# Patient Record
Sex: FEMALE | Race: ASIAN | NOT HISPANIC OR LATINO
[De-identification: names, ages, dates, MRNs, and addresses within clinical notes are randomized per-mention and may not be internally consistent; named-entity substitution may affect disease eponyms.]

---

## 2022-03-17 ENCOUNTER — APPOINTMENT (OUTPATIENT)
Dept: NEUROLOGY | Facility: CLINIC | Age: 87
End: 2022-03-17

## 2022-09-26 ENCOUNTER — APPOINTMENT (OUTPATIENT)
Dept: NEUROLOGY | Facility: CLINIC | Age: 87
End: 2022-09-26

## 2022-09-26 VITALS
HEART RATE: 95 BPM | BODY MASS INDEX: 22.66 KG/M2 | HEIGHT: 61 IN | WEIGHT: 120 LBS | DIASTOLIC BLOOD PRESSURE: 84 MMHG | SYSTOLIC BLOOD PRESSURE: 139 MMHG

## 2022-09-26 DIAGNOSIS — I67.9 CEREBROVASCULAR DISEASE, UNSPECIFIED: ICD-10-CM

## 2022-09-26 DIAGNOSIS — R41.89 OTHER SYMPTOMS AND SIGNS INVOLVING COGNITIVE FUNCTIONS AND AWARENESS: ICD-10-CM

## 2022-09-26 DIAGNOSIS — I10 ESSENTIAL (PRIMARY) HYPERTENSION: ICD-10-CM

## 2022-09-26 DIAGNOSIS — G47.09 OTHER INSOMNIA: ICD-10-CM

## 2022-09-26 DIAGNOSIS — I63.9 CEREBRAL INFARCTION, UNSPECIFIED: ICD-10-CM

## 2022-09-26 DIAGNOSIS — R45.3 DEMORALIZATION AND APATHY: ICD-10-CM

## 2022-09-26 PROCEDURE — 99205 OFFICE O/P NEW HI 60 MIN: CPT

## 2022-09-26 RX ORDER — AMLODIPINE BESYLATE 10 MG/1
10 TABLET ORAL DAILY
Refills: 0 | Status: ACTIVE | COMMUNITY
Start: 2022-09-26

## 2022-09-26 RX ORDER — ANASTROZOLE TABLETS 1 MG/1
1 TABLET ORAL DAILY
Refills: 0 | Status: ACTIVE | COMMUNITY
Start: 2022-09-26

## 2022-09-26 RX ORDER — ALENDRONATE SODIUM 70 MG/1
70 TABLET ORAL
Refills: 0 | Status: ACTIVE | COMMUNITY
Start: 2022-09-26

## 2022-09-26 RX ORDER — ROSUVASTATIN CALCIUM 10 MG/1
10 TABLET, FILM COATED ORAL DAILY
Refills: 0 | Status: ACTIVE | COMMUNITY
Start: 2022-09-26

## 2022-09-26 NOTE — REASON FOR VISIT
[Initial Evaluation] : an initial evaluation [Family Member] : family member [FreeTextEntry1] : cognitive decline.

## 2022-09-26 NOTE — ASSESSMENT
[FreeTextEntry1] : Assessment:\par 88yo RH  woman with cognitive decline, starting c.a. when she had a stroke in 2015.\par Upon review of the DWI images, stroke involved both thalami, R>L (more posteriorly though), and several subcortical areas in the posterior WM. THis alone may have caused some memory impairment. 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 Good VS and praxis and speech.\par \par Diagnostic Impression:\par -cognitive decline\par -cerebrovascular disease and hx of stroke (Bilateral PCA) \par \par Plan:\par -repeat MRI brain\par -labs\par -increase mirtazapine to 15mg; if sleep not improved, we will try Melatonin pathway\par -consider Aricept.\par \par A thorough discussion was entertained with the patient/caregiver regarding the use of psychoactive medications, their possible benefits and AE profile, including the risk of cardiovascular complications, including but not limited to applicable black box warning and teratogenicity, where appropriate.\par We discussed the benefits of being active, physically and mentally, and the need to to establish a routine in this respect.\par Driving abilities and firearms possession and use were discussed, in relation to progression of the cognitive decline, and the need to assess them periodically.\par Patient/caregiver advised to bring previous records to this office.\par All questions were answered at the time of the visit. We are certainly available for further discussion as needed.\par Patient/caregiver fully understands and agree with the plan.\par

## 2022-09-26 NOTE — HISTORY OF PRESENT ILLNESS
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 \par \par PMH:\par Pt seen by Dr. Cordova and Libman in  Re a stroke.\par \par Over the last few years, and more so recently, and after the 2015 stroke, she has started to have cognitive decline, with loss of interests, is less talkative, reduced interests and is progressively more apathetic. \par \par -Memory: STM issues, forgets all recent events\par -Speech: ok, but slower speech, and tends to refrain from speaking\par -Orientation: poor\par -Praxis: poor\par -Decision making/Executive fx/Multitasking: poor\par \par -Sleep: poor sleep reported, but stays in bed till late in the morning, goes back to bed after meals, and dozes off on couch\par \par -Appetite: ok, no significant changes; poor water drinker\par \par -Motor symptoms: residual L HP (2015 stroke), uses cane, tends to bend fwd a bit more\par \par -B/B: frequent UTIs, frequent polyuria\par \par -Psychiatric symptoms: more irritable\par \par -Functional status:\par Cordova Index of [place] in Activities of Daily Living:\par 1. Bathing/Showerin -need supervision\par 2. Dressin\par 3. Toiletin\par 4. Transferrin\par 5. Continence: 1\par 6: Feedin\par \par TOTAL: \par \par [place]-Kenny Instrumental Activities of Daily Living:\par A. Ability to Use Telephone: 0\par B. Shoppin\par C. Food Preparation: 0\par D. Housekeepin\par E. Laundry: 0\par F. Transportation: 0\par G. Responsibility for Own Medications: 0\par H: Ability to Handle Finances: 0\par \par TOTAL: 0\par \par CDR: 1.5\par \par -Professional status: housewife\par \par PCP and other physicians:\par -PCP: \par Dr. Charli Hobbs MD\par Internist in [place], [place]\par Address: [address] [place] [address]\par Phone: [phone], \par -Neuro: Libman, [place]\par \par Workup done:\par n.a.

## 2022-09-26 NOTE — PHYSICAL EXAM
[General Appearance - Alert] : alert [General Appearance - In No Acute Distress] : in no acute distress [Impaired Insight] : insight and judgment were intact [Affect] : the affect was normal [Person] : oriented to person [Remote Intact] : remote memory intact [Registration Intact] : recent registration memory intact [Concentration Intact] : normal concentrating ability [Visual Intact] : visual attention was ~T not ~L decreased [Naming Objects] : no difficulty naming common objects [Repeating Phrases] : no difficulty repeating a phrase [Fluency] : fluency intact [Comprehension] : comprehension intact [Reading] : reading intact [Past History] : adequate knowledge of personal past history [Vocabulary] : adequate range of vocabulary [Total Score ___ / 30] : the patient achieved a score of [unfilled] /30 [Date / Time ___ / 5] : date / time [unfilled] / 5 [Place ___ / 5] : place [unfilled] / 5 [Registration ___ / 3] : registration [unfilled] / 3 [Serial Sevens ___/5] : serial sevens [unfilled] / 5 [Naming 2 Objects ___ / 2] : naming two objects [unfilled] / 2 [Repeating a Sentence ___ / 1] : repeating a sentence [unfilled] / 1 [Writing a Sentence ___ / 1] : write sentence [unfilled] / 1 [3-stage Verbal Command ___ / 3] : three-stage verbal command [unfilled] / 3 [Written Command ___ / 1] : written command [unfilled] / 1 [Copy a Design ___ / 1] : copy a design [unfilled] / 1 [Recall ___ / 3] : recall [unfilled] / 3 [Cranial Nerves Optic (II)] : visual acuity intact bilaterally,  visual fields full to confrontation, pupils equal round and reactive to light [Cranial Nerves Trigeminal (V)] : facial sensation intact symmetrically [Cranial Nerves Facial (VII)] : face symmetrical [Cranial Nerves Vestibulocochlear (VIII)] : hearing was intact bilaterally [Cranial Nerves Glossopharyngeal (IX)] : tongue and palate midline [Cranial Nerves Accessory (XI - Cranial And Spinal)] : head turning and shoulder shrug symmetric [Cranial Nerves Hypoglossal (XII)] : there was no tongue deviation with protrusion [Motor Strength] : muscle strength was normal in all four extremities [Involuntary Movements] : no involuntary movements were seen [No Muscle Atrophy] : normal bulk in all four extremities [Motor Handedness Right-Handed] : the patient is right hand dominant [Sensation Tactile Decrease] : light touch was intact [Balance] : balance was intact [2+] : Ankle jerk left 2+ [Sclera] : the sclera and conjunctiva were normal [PERRL With Normal Accommodation] : pupils were equal in size, round, reactive to light, with normal accommodation [Outer Ear] : the ears and nose were normal in appearance [Oropharynx] : the oropharynx was normal [Neck Appearance] : the appearance of the neck was normal [Neck Cervical Mass (___cm)] : no neck mass was observed [Jugular Venous Distention Increased] : there was no jugular-venous distention [Thyroid Diffuse Enlargement] : the thyroid was not enlarged [Thyroid Nodule] : there were no palpable thyroid nodules [Auscultation Breath Sounds / Voice Sounds] : lungs were clear to auscultation bilaterally [Heart Rate And Rhythm] : heart rate was normal and rhythm regular [Heart Sounds] : normal S1 and S2 [Heart Sounds Gallop] : no gallops [Murmurs] : no murmurs [Heart Sounds Pericardial Friction Rub] : no pericardial rub [Arterial Pulses Carotid] : carotid pulses were normal with no bruits [Full Pulse] : the pedal pulses are present [Edema] : there was no peripheral edema [Bowel Sounds] : normal bowel sounds [Abdomen Soft] : soft [Abdomen Tenderness] : non-tender [Abdomen Mass (___ Cm)] : no abdominal mass palpated [No CVA Tenderness] : no ~M costovertebral angle tenderness [No Spinal Tenderness] : no spinal tenderness [Abnormal Walk] : normal gait [Nail Clubbing] : no clubbing  or cyanosis of the fingernails [Musculoskeletal - Swelling] : no joint swelling seen [Motor Tone] : muscle strength and tone were normal [Skin Color & Pigmentation] : normal skin color and pigmentation [Skin Turgor] : normal skin turgor [] : no rash [Place] : disoriented to place [Time] : disoriented to time [Short Term Intact] : short term memory impaired [Span Intact] : the attention span was decreased [Writing A Sentence] : difficulty writing a sentence [Current Events] : inadequate knowledge of current events [Motor Strength Upper Extremities Bilaterally] : strength was normal in both upper extremities [Motor Strength Lower Extremities Bilaterally] : strength was normal in both lower extremities [Romberg's Sign] : Romberg's sign was negtive [Limited Balance] : balance was intact [Past-pointing] : there was no past-pointing [Tremor] : no tremor present [Plantar Reflex Right Only] : normal on the right [Plantar Reflex Left Only] : normal on the left [FreeTextEntry4] : Exam very limited. \par Alt pattern: ok\par Spiral: ok\par Clock: ok.\par Praxis: grossly ok. 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 [FreeTextEntry8] : cautioned gait, slight limp on L side

## 2023-01-18 ENCOUNTER — RX RENEWAL (OUTPATIENT)
Age: 88
End: 2023-01-18

## 2023-03-30 ENCOUNTER — RX RENEWAL (OUTPATIENT)
Age: 88
End: 2023-03-30

## 2024-03-19 ENCOUNTER — RX RENEWAL (OUTPATIENT)
Age: 89
End: 2024-03-19

## 2024-03-19 RX ORDER — MIRTAZAPINE 15 MG/1
15 TABLET, FILM COATED ORAL
Qty: 90 | Refills: 3 | Status: ACTIVE | COMMUNITY
Start: 2022-09-26 | End: 1900-01-01

## 2025-03-10 ENCOUNTER — RX RENEWAL (OUTPATIENT)
Age: 89
End: 2025-03-10

## 2025-06-24 ENCOUNTER — INPATIENT (INPATIENT)
Facility: HOSPITAL | Age: 89
LOS: 9 days | Discharge: NURSING, REHAB OR LONG-TERM CARE | DRG: 64 | End: 2025-07-04
Attending: INTERNAL MEDICINE | Admitting: INTERNAL MEDICINE
Payer: MEDICARE

## 2025-06-24 VITALS
DIASTOLIC BLOOD PRESSURE: 73 MMHG | HEART RATE: 90 BPM | SYSTOLIC BLOOD PRESSURE: 151 MMHG | RESPIRATION RATE: 16 BRPM | HEIGHT: 61 IN | OXYGEN SATURATION: 98 %

## 2025-06-24 LAB
ALBUMIN SERPL ELPH-MCNC: 4.1 G/DL — SIGNIFICANT CHANGE UP (ref 3.3–5)
ALP SERPL-CCNC: 66 U/L — SIGNIFICANT CHANGE UP (ref 40–120)
ALT FLD-CCNC: 29 U/L — SIGNIFICANT CHANGE UP (ref 10–45)
APPEARANCE UR: CLEAR — SIGNIFICANT CHANGE UP
APTT BLD: 38.9 SEC — HIGH (ref 26.1–36.8)
AST SERPL-CCNC: 29 U/L — SIGNIFICANT CHANGE UP (ref 10–40)
BACTERIA # UR AUTO: ABNORMAL /HPF
BASOPHILS # BLD AUTO: 0.05 K/UL — SIGNIFICANT CHANGE UP (ref 0–0.2)
BASOPHILS NFR BLD AUTO: 0.6 % — SIGNIFICANT CHANGE UP (ref 0–2)
BILIRUB SERPL-MCNC: 1 MG/DL — SIGNIFICANT CHANGE UP (ref 0.2–1.2)
BILIRUB UR-MCNC: NEGATIVE — SIGNIFICANT CHANGE UP
BUN SERPL-MCNC: 17 MG/DL — SIGNIFICANT CHANGE UP (ref 7–23)
CALCIUM SERPL-MCNC: 9.2 MG/DL — SIGNIFICANT CHANGE UP (ref 8.4–10.5)
CAST: 2 /LPF — SIGNIFICANT CHANGE UP (ref 0–4)
CHLORIDE SERPL-SCNC: 103 MMOL/L — SIGNIFICANT CHANGE UP (ref 96–108)
CO2 SERPL-SCNC: 26 MMOL/L — SIGNIFICANT CHANGE UP (ref 22–31)
COLOR SPEC: YELLOW — SIGNIFICANT CHANGE UP
CREAT SERPL-MCNC: 0.96 MG/DL — SIGNIFICANT CHANGE UP (ref 0.5–1.3)
DIFF PNL FLD: NEGATIVE — SIGNIFICANT CHANGE UP
EGFR: 57 ML/MIN/1.73M2 — LOW
EGFR: 57 ML/MIN/1.73M2 — LOW
EOSINOPHIL # BLD AUTO: 0.14 K/UL — SIGNIFICANT CHANGE UP (ref 0–0.5)
EOSINOPHIL NFR BLD AUTO: 1.7 % — SIGNIFICANT CHANGE UP (ref 0–6)
GAS PNL BLDV: SIGNIFICANT CHANGE UP
GLUCOSE SERPL-MCNC: 117 MG/DL — HIGH (ref 70–99)
GLUCOSE UR QL: NEGATIVE MG/DL — SIGNIFICANT CHANGE UP
HCT VFR BLD CALC: 39.8 % — SIGNIFICANT CHANGE UP (ref 34.5–45)
HGB BLD-MCNC: 12.9 G/DL — SIGNIFICANT CHANGE UP (ref 11.5–15.5)
IMM GRANULOCYTES # BLD AUTO: 0.03 K/UL — SIGNIFICANT CHANGE UP (ref 0–0.07)
IMM GRANULOCYTES NFR BLD AUTO: 0.4 % — SIGNIFICANT CHANGE UP (ref 0–0.9)
INR BLD: 1.43 RATIO — HIGH (ref 0.85–1.16)
KETONES UR QL: NEGATIVE MG/DL — SIGNIFICANT CHANGE UP
LEUKOCYTE ESTERASE UR-ACNC: ABNORMAL
LYMPHOCYTES # BLD AUTO: 3.12 K/UL — SIGNIFICANT CHANGE UP (ref 1–3.3)
LYMPHOCYTES NFR BLD AUTO: 38.9 % — SIGNIFICANT CHANGE UP (ref 13–44)
MCHC RBC-ENTMCNC: 31.1 PG — SIGNIFICANT CHANGE UP (ref 27–34)
MCHC RBC-ENTMCNC: 32.4 G/DL — SIGNIFICANT CHANGE UP (ref 32–36)
MCV RBC AUTO: 95.9 FL — SIGNIFICANT CHANGE UP (ref 80–100)
MONOCYTES # BLD AUTO: 0.72 K/UL — SIGNIFICANT CHANGE UP (ref 0–0.9)
MONOCYTES NFR BLD AUTO: 9 % — SIGNIFICANT CHANGE UP (ref 2–14)
NEUTROPHILS # BLD AUTO: 3.97 K/UL — SIGNIFICANT CHANGE UP (ref 1.8–7.4)
NEUTROPHILS NFR BLD AUTO: 49.4 % — SIGNIFICANT CHANGE UP (ref 43–77)
NITRITE UR-MCNC: POSITIVE
NRBC # BLD AUTO: 0 K/UL — SIGNIFICANT CHANGE UP (ref 0–0)
NRBC # FLD: 0 K/UL — SIGNIFICANT CHANGE UP (ref 0–0)
NRBC BLD AUTO-RTO: 0 /100 WBCS — SIGNIFICANT CHANGE UP (ref 0–0)
PH UR: 7.5 — SIGNIFICANT CHANGE UP (ref 5–8)
PLATELET # BLD AUTO: 197 K/UL — SIGNIFICANT CHANGE UP (ref 150–400)
PMV BLD: 10.6 FL — SIGNIFICANT CHANGE UP (ref 7–13)
POTASSIUM SERPL-MCNC: 3.9 MMOL/L — SIGNIFICANT CHANGE UP (ref 3.5–5.3)
POTASSIUM SERPL-SCNC: 3.9 MMOL/L — SIGNIFICANT CHANGE UP (ref 3.5–5.3)
PROT SERPL-MCNC: 7 G/DL — SIGNIFICANT CHANGE UP (ref 6–8.3)
PROT UR-MCNC: NEGATIVE MG/DL — SIGNIFICANT CHANGE UP
PROTHROM AB SERPL-ACNC: 16.2 SEC — HIGH (ref 9.9–13.4)
RBC # BLD: 4.15 M/UL — SIGNIFICANT CHANGE UP (ref 3.8–5.2)
RBC # FLD: 12.4 % — SIGNIFICANT CHANGE UP (ref 10.3–14.5)
RBC CASTS # UR COMP ASSIST: 1 /HPF — SIGNIFICANT CHANGE UP (ref 0–4)
SODIUM SERPL-SCNC: 142 MMOL/L — SIGNIFICANT CHANGE UP (ref 135–145)
SP GR SPEC: 1.02 — SIGNIFICANT CHANGE UP (ref 1–1.03)
SQUAMOUS # UR AUTO: 0 /HPF — SIGNIFICANT CHANGE UP (ref 0–5)
TROPONIN T, HIGH SENSITIVITY RESULT: 9 NG/L — SIGNIFICANT CHANGE UP (ref 0–51)
UROBILINOGEN FLD QL: 0.2 MG/DL — SIGNIFICANT CHANGE UP (ref 0.2–1)
WBC # BLD: 8.03 K/UL — SIGNIFICANT CHANGE UP (ref 3.8–10.5)
WBC # FLD AUTO: 8.03 K/UL — SIGNIFICANT CHANGE UP (ref 3.8–10.5)
WBC UR QL: 4 /HPF — SIGNIFICANT CHANGE UP (ref 0–5)

## 2025-06-24 PROCEDURE — 99285 EMERGENCY DEPT VISIT HI MDM: CPT

## 2025-06-24 PROCEDURE — 83605 ASSAY OF LACTIC ACID: CPT

## 2025-06-24 PROCEDURE — 70450 CT HEAD/BRAIN W/O DYE: CPT

## 2025-06-24 PROCEDURE — 0042T: CPT

## 2025-06-24 PROCEDURE — 84484 ASSAY OF TROPONIN QUANT: CPT

## 2025-06-24 PROCEDURE — 70496 CT ANGIOGRAPHY HEAD: CPT | Mod: 26

## 2025-06-24 PROCEDURE — 70496 CT ANGIOGRAPHY HEAD: CPT

## 2025-06-24 PROCEDURE — 85610 PROTHROMBIN TIME: CPT

## 2025-06-24 PROCEDURE — 70498 CT ANGIOGRAPHY NECK: CPT

## 2025-06-24 PROCEDURE — 82330 ASSAY OF CALCIUM: CPT

## 2025-06-24 PROCEDURE — 81001 URINALYSIS AUTO W/SCOPE: CPT

## 2025-06-24 PROCEDURE — 85730 THROMBOPLASTIN TIME PARTIAL: CPT

## 2025-06-24 PROCEDURE — 93010 ELECTROCARDIOGRAM REPORT: CPT

## 2025-06-24 PROCEDURE — 84132 ASSAY OF SERUM POTASSIUM: CPT

## 2025-06-24 PROCEDURE — 82962 GLUCOSE BLOOD TEST: CPT

## 2025-06-24 PROCEDURE — 70450 CT HEAD/BRAIN W/O DYE: CPT | Mod: 26,XU

## 2025-06-24 PROCEDURE — 80053 COMPREHEN METABOLIC PANEL: CPT

## 2025-06-24 PROCEDURE — 85014 HEMATOCRIT: CPT

## 2025-06-24 PROCEDURE — 82803 BLOOD GASES ANY COMBINATION: CPT

## 2025-06-24 PROCEDURE — 85018 HEMOGLOBIN: CPT

## 2025-06-24 PROCEDURE — 82947 ASSAY GLUCOSE BLOOD QUANT: CPT

## 2025-06-24 PROCEDURE — 85025 COMPLETE CBC W/AUTO DIFF WBC: CPT

## 2025-06-24 PROCEDURE — 87086 URINE CULTURE/COLONY COUNT: CPT

## 2025-06-24 PROCEDURE — 70498 CT ANGIOGRAPHY NECK: CPT | Mod: 26

## 2025-06-24 PROCEDURE — 99222 1ST HOSP IP/OBS MODERATE 55: CPT | Mod: GC

## 2025-06-24 PROCEDURE — 84295 ASSAY OF SERUM SODIUM: CPT

## 2025-06-24 PROCEDURE — 82435 ASSAY OF BLOOD CHLORIDE: CPT

## 2025-06-24 RX ORDER — B1/B2/B3/B5/B6/B12/VIT C/FOLIC 500-0.5 MG
1 TABLET ORAL
Refills: 0 | DISCHARGE

## 2025-06-24 RX ORDER — ROSUVASTATIN CALCIUM 5 MG/1
1 TABLET, FILM COATED ORAL
Refills: 0 | DISCHARGE

## 2025-06-24 RX ORDER — LOSARTAN POTASSIUM 100 MG/1
100 TABLET, FILM COATED ORAL DAILY
Refills: 0 | Status: DISCONTINUED | OUTPATIENT
Start: 2025-06-24 | End: 2025-06-29

## 2025-06-24 RX ORDER — APIXABAN 2.5 MG/1
1 TABLET, FILM COATED ORAL
Refills: 0 | DISCHARGE

## 2025-06-24 RX ORDER — CEFTRIAXONE 500 MG/1
1000 INJECTION, POWDER, FOR SOLUTION INTRAMUSCULAR; INTRAVENOUS EVERY 24 HOURS
Refills: 0 | Status: DISCONTINUED | OUTPATIENT
Start: 2025-06-24 | End: 2025-06-29

## 2025-06-24 RX ORDER — APIXABAN 2.5 MG/1
2.5 TABLET, FILM COATED ORAL EVERY 12 HOURS
Refills: 0 | Status: DISCONTINUED | OUTPATIENT
Start: 2025-06-24 | End: 2025-07-04

## 2025-06-24 RX ORDER — CEFTRIAXONE 500 MG/1
1000 INJECTION, POWDER, FOR SOLUTION INTRAMUSCULAR; INTRAVENOUS ONCE
Refills: 0 | Status: COMPLETED | OUTPATIENT
Start: 2025-06-24 | End: 2025-06-24

## 2025-06-24 RX ORDER — OLMESARTAN MEDOXOMIL 5 MG/1
1 TABLET, FILM COATED ORAL
Refills: 0 | DISCHARGE

## 2025-06-24 RX ORDER — METOPROLOL SUCCINATE 50 MG/1
1 TABLET, EXTENDED RELEASE ORAL
Refills: 0 | DISCHARGE

## 2025-06-24 RX ORDER — MIRTAZAPINE 30 MG/1
1 TABLET, FILM COATED ORAL
Refills: 0 | DISCHARGE

## 2025-06-24 RX ORDER — ROSUVASTATIN CALCIUM 5 MG/1
20 TABLET, FILM COATED ORAL AT BEDTIME
Refills: 0 | Status: DISCONTINUED | OUTPATIENT
Start: 2025-06-24 | End: 2025-07-04

## 2025-06-24 RX ORDER — METOPROLOL SUCCINATE 50 MG/1
100 TABLET, EXTENDED RELEASE ORAL DAILY
Refills: 0 | Status: DISCONTINUED | OUTPATIENT
Start: 2025-06-24 | End: 2025-06-29

## 2025-06-24 RX ORDER — ONDANSETRON HCL/PF 4 MG/2 ML
4 VIAL (ML) INJECTION ONCE
Refills: 0 | Status: COMPLETED | OUTPATIENT
Start: 2025-06-24 | End: 2025-06-24

## 2025-06-24 RX ORDER — MIRTAZAPINE 30 MG/1
15 TABLET, FILM COATED ORAL AT BEDTIME
Refills: 0 | Status: DISCONTINUED | OUTPATIENT
Start: 2025-06-24 | End: 2025-06-25

## 2025-06-24 RX ADMIN — APIXABAN 2.5 MILLIGRAM(S): 2.5 TABLET, FILM COATED ORAL at 19:25

## 2025-06-24 RX ADMIN — METOPROLOL SUCCINATE 100 MILLIGRAM(S): 50 TABLET, EXTENDED RELEASE ORAL at 19:25

## 2025-06-24 RX ADMIN — CEFTRIAXONE 100 MILLIGRAM(S): 500 INJECTION, POWDER, FOR SOLUTION INTRAMUSCULAR; INTRAVENOUS at 11:52

## 2025-06-24 RX ADMIN — LOSARTAN POTASSIUM 100 MILLIGRAM(S): 100 TABLET, FILM COATED ORAL at 19:25

## 2025-06-24 RX ADMIN — ROSUVASTATIN CALCIUM 20 MILLIGRAM(S): 5 TABLET, FILM COATED ORAL at 22:12

## 2025-06-24 RX ADMIN — MIRTAZAPINE 15 MILLIGRAM(S): 30 TABLET, FILM COATED ORAL at 22:12

## 2025-06-24 RX ADMIN — Medication 4 MILLIGRAM(S): at 10:05

## 2025-06-24 NOTE — H&P ADULT - HISTORY OF PRESENT ILLNESS
88 yo female, with PMHx of HTN, HLD, prior stroke in 2015 with residual left eye vision loss (bilateral occipital lobes and right posteromedial temporal lobe, right thalamic lacunar, right basal ganglia, right paramedian midbrain stroke), Afib (on Eliquis), cognitive decline (follows with Dr. Argueta), BIBEMS due to inability to speak or ambulate. LKW at around 3 AM on 6/24/2025. Daughter at bedside reports that she noted that patient was only making grunting sounds in response to conversation and appeared to have difficulty recognizing her daughter when she saw patient on 6/24/2025 at around 9 AM. However, daughter reports that patient has now returned to baseline. Of note, daughter stated that patient was reporting abdominal pain on 6/23/2025. Ambulates using a cane at baseline, requires assistance with eating and bathing. Reports adherence to Eliquis currently but reportedly had not been taking Eliquis for about 1 month for unknown reasons. Translation from Cantonese provided by daughter at bedside per patient and family preference.

## 2025-06-24 NOTE — ED ADULT NURSE REASSESSMENT NOTE - NS ED NURSE REASSESS COMMENT FT1
Handoff received from break nurse. Q1 hour neuro check not done at 1325, performed when back from break.

## 2025-06-24 NOTE — ED ADULT NURSE REASSESSMENT NOTE - GENERAL PATIENT STATE
comfortable appearance/cooperative/family/SO at bedside
comfortable appearance/cooperative/family/SO at bedside/resting/sleeping
comfortable appearance/cooperative/family/SO at bedside

## 2025-06-24 NOTE — ED ADULT NURSE NOTE - OBJECTIVE STATEMENT
Pt is a 89y F PMH CVA w/ L sided residual weakness, HTN, HLD, Afib on Eliquis brought in by EMS presenting from home as Code stroke for difficulty speaking at approx 9AM LKW 3AM. Pt is primarily Cantonese speaking, daughter at bedside able to provide hx and translate. Per daughter pt currently at baseline. Pt taken to CT, began vomiting. Pt denies fever, chills, cough, abd pain. Pt is A&Ox4, breathing unlabored and spontaneous, move all extremities. Pt resting in stretcher, bed in lowest position, aware of plan of care. Comfort and safety measures maintained.

## 2025-06-24 NOTE — ED PROVIDER NOTE - NSICDXPASTMEDICALHX_GEN_ALL_CORE_FT
PAST MEDICAL HISTORY:  Afib     CVA (cerebral vascular accident)     Hyperlipidemia     Hypertension      Fluconazole Counseling:  Patient counseled regarding adverse effects of fluconazole including but not limited to headache, diarrhea, nausea, upset stomach, liver function test abnormalities, taste disturbance, and stomach pain.  There is a rare possibility of liver failure that can occur when taking fluconazole.  The patient understands that monitoring of LFTs and kidney function test may be required, especially at baseline. The patient verbalized understanding of the proper use and possible adverse effects of fluconazole.  All of the patient's questions and concerns were addressed.

## 2025-06-24 NOTE — ED PROVIDER NOTE - ATTENDING CONTRIBUTION TO CARE
code stroke called on arrival    attending Sunil: 89y h/o prior stroke with residual L sided weakness, HTN, HLD, Afib on Eliquis p/w difficulty speaking, LKN 3 AM.  Daughter at bedside for translation and collateral.  Seemingly had difficulty getting words out as well as inability to walk. Exam as above. Will obtain labs, STAT CT imaging and neuro eval, reassess

## 2025-06-24 NOTE — ED ADULT NURSE NOTE - NSFALLHARMRISKINTERV_ED_ALL_ED

## 2025-06-24 NOTE — H&P ADULT - NSHPPHYSICALEXAM_GEN_ALL_CORE
Vital Signs Last 24 Hrs  T(C): 36.6 (24 Jun 2025 18:40), Max: 36.6 (24 Jun 2025 18:40)  T(F): 97.8 (24 Jun 2025 18:40), Max: 97.8 (24 Jun 2025 18:40)  HR: 82 (24 Jun 2025 18:40) (82 - 96)  BP: 111/72 (24 Jun 2025 18:40) (110/77 - 151/73)  BP(mean): 85 (24 Jun 2025 18:40) (85 - 102)  RR: 20 (24 Jun 2025 18:40) (15 - 20)  SpO2: 95% (24 Jun 2025 18:40) (95% - 98%)    Parameters below as of 24 Jun 2025 18:40  Patient On (Oxygen Delivery Method): room air      PHYSICAL EXAM:  GENERAL: NAD, well-developed  HEAD:  Atraumatic, Normocephalic  EYES: EOMI, PERRLA, conjunctiva and sclera clear  NECK: Supple, No JVD  CHEST/LUNG: Clear to auscultation bilaterally; No wheeze  HEART: s1/2. irreg irreg   ABDOMEN: Soft, Nontender, Nondistended; Bowel sounds present  EXTREMITIES:  2+ Peripheral Pulses, No clubbing, cyanosis, or edema  PSYCH: AAOx3  NEUROLOGY: moves all ext. slurred speech   SKIN: No rashes or lesions

## 2025-06-24 NOTE — ED PROVIDER NOTE - PROGRESS NOTE DETAILS
Jeremiah Meehan DO (PGY3)  received s/o on this patient -  89RHF with PMHx of HTN, HLD, prior stroke in 2015 with residual left eye vision loss (bilateral occipital lobes and right posteromedial temporal lobe, right thalamic lacunar, right basal ganglia, right paramedian midbrain stroke), Afib (on Eliquis), cognitive decline (follows with Dr. Argueta)  Patient with UTI, neurology has evaluated patient - recommending inpatient MRI. Endorsed plan to patient.

## 2025-06-24 NOTE — ED ADULT NURSE REASSESSMENT NOTE - NS ED NURSE REASSESS COMMENT FT1
Handoff received from RN Stanton. Upon assessment Pt AOx4, primarily Cantonese speaking, daughter at bedside translating, breathing spontaneously, hemodynamically stable, gross neuro and motor sensory intact.

## 2025-06-24 NOTE — ED ADULT NURSE NOTE - CAS EDN DISCHARGE INTERVENTIONS
Problem: VTE, Risk for  Goal: # No s/s of VTE  Outcome: Outcome Met, Continue evaluating goal progress toward completion  Goal: # Verbalizes understanding of VTE risk factors and prevention  Description: Document education using the patient education activity.   Outcome: Outcome Met, Continue evaluating goal progress toward completion     Problem: Pressure Injury, Risk for  Goal: # Verbalizes understanding of PI risk factors and prevention strategies  Description: Document education using the patient education activity.   Outcome: Outcome Met, Complete Goal     Problem: Non-Pressure Injury Wound  Goal: # Verbalizes understanding of wound and wound care  Description: If abnormality is a skin tear, avoid using tape on skin including transparent dressings. Document education using the patient education activity.  Outcome: Outcome Met, Continue evaluating goal progress toward completion  Goal: Participates in wound care activities  Outcome: Outcome Not Met, Continue to Monitor     Problem: Myocardial Dysfunction requiring Mechanical Circulatory Support Device  Goal: Hemodynamic stability achieved/maintained  Description: Ensure device is set to optimize hemodynamics  Outcome: Outcome Met, Continue evaluating goal progress toward completion  Goal: Mechanical Circulatory Support Device (MCSD) safety maintained  Description: Patient remains on AC Power until test is passed Power connections ONLY with MCSD staff until test passed. Perform Mechanical Circulatory Support Device system checks per protocol.  Assess drive lines for kinks, loose connections and stability Q 8 hrs and with activity.  Keep backup console near bedside and plugged into AC power.  Outcome: Outcome Met, Continue evaluating goal progress toward completion  Goal: Activity level is maintained/returns to level needed for d/c  Description: SpO2, HR, MCSD flows, RR, & BP in expected range in response to activity, walking, ADLs, stairs  Outcome: Outcome Not  Met, Continue to Monitor  Goal: Verbalizes understanding of preop, periop and postop teaching (pt/family)  Description: Document in Patient Education Activity  Outcome: Outcome Not Met, Continue to Monitor     Problem: Activity Intolerance  Goal: # Functional status is maintained or returned to baseline  Outcome: Outcome Not Met, Continue to Monitor  Goal: # Tolerates activity for d/c setting with no clinical problems  Outcome: Outcome Not Met, Continue to Monitor     Problem: Coping, Ineffective  Goal: Identifies personal stressors  Outcome: Outcome Not Met, Continue to Monitor  Goal: Identifies potential coping strategies  Outcome: Outcome Not Met, Continue to Monitor  Goal: Identifies personal or community-based support systems (Patient)  Outcome: Outcome Met, Complete Goal      IV intact

## 2025-06-24 NOTE — ED PROVIDER NOTE - CLINICAL SUMMARY MEDICAL DECISION MAKING FREE TEXT BOX
89 female past medical history CVA with left-sided residual weakness, hypertension, hyperlipidemia, cardiac arrhythmia on Eliquis presenting as a code stroke for difficulty speaking.  Last known normal 3 AM.  Daughter at bedside for translation and collateral.  When daughter went to check on patient this morning she was trying to speak but was unable to get the words out.  So that the patient could not walk.  Yesterday and at 3 AM this morning the patient was at her baseline.  Vitals nonactionable.  On exam: Patient with expressive aphasia. Pupils equal round and reactive to light.  Moving all extremities.  Heart regular rate.  Lungs with symmetric chest expansion bilaterally.  Differential diagnosis includes but not limited to: CVA, TIA, infectious vs metabolic etiology.  Plan: blood work, UA, f/u brain imaging and neuro recs. Will re-assess. Likely tba. 89 female past medical history CVA with left-sided residual weakness, hypertension, hyperlipidemia, cardiac arrhythmia on Eliquis presenting as a code stroke for difficulty speaking.  Last known normal 3 AM.  Daughter at bedside for translation and collateral.  When daughter went to check on patient this morning she was trying to speak but was unable to get the words out.  So that the patient could not walk.  Yesterday and at 3 AM this morning the patient was at her baseline.  Vitals nonactionable.  On exam: Patient with expressive aphasia. Pupils equal round and reactive to light.  Moving all extremities.  Heart regular rate.  Lungs with symmetric chest expansion bilaterally.  Differential diagnosis includes but not limited to: CVA, TIA, infectious vs metabolic etiology.  Plan: blood work, UA, f/u brain imaging and neuro recs. Will re-assess.

## 2025-06-24 NOTE — CONSULT NOTE ADULT - ASSESSMENT
Assessment:    Impression:    Recommendations: Assessment:    Impression: Transient episode of disorientation and altered mental status. 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     Recommendations:  - Continue home Eliquis  - Toxic Metabolic workup: UA, UCx, CXR, RVP+COVID, Mg, Phos  - Infectious workup per ED   - Follow up with Dr. Argueta and Dr. Libman at [address] [place] [address] 1-2 weeks after discharge. Please instruct the patient to call [phone] to schedule this appointment.      Case and plan discussed with stroke fellow Dr. Cordoba under supervision of attending Dr. Bowden.    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 Argueta), BIBEMS due to inability to speak or ambulate. Neurological exam with no new focal deficits, as patient continues to have left eye visual field deficit, which has been a residual deficit from prior stroke in 2015. CTH on 6/24/2025 nonacute, CTA H/N with no LVO, CTP with no deficit.     Impression: Transient episode of disorientation and altered mental status with current return to baseline. 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     Recommendations:  - Continue home Eliquis  - Toxic Metabolic workup: UA, UCx, CXR, RVP+COVID, Mg, Phos  - Infectious workup per ED   - No further neurovascular workup required at this time  - Follow up with Dr. Argueta and Dr. Libman at [address] [place] [address] 1-2 weeks after discharge. Please instruct the patient to call [phone] to schedule this appointment.        Case and plan discussed with stroke fellow Dr. Cordoba under supervision of attending Dr. Bowden.    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 Argueta), BIBEMS due to inability to speak or ambulate. Neurological exam with no new focal deficits, as patient continues to have left eye visual field deficit, which has been a residual deficit from prior stroke in 2015. CTH on 6/24/2025 nonacute, CTA H/N with no LVO, CTP with no deficit.     Impression: Transient episode of disorientation and altered mental status with current return to baseline. 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     Recommendations:  - Continue home Eliquis  - Toxic Metabolic workup: UA, UCx, CXR, RVP+COVID, Mg, Phos  - Infectious workup per ED   - No further neurovascular workup required at this time  - Follow up with Dr. Argueta and Dr. Libman at [address] [place] [address] 1-2 weeks after discharge. Please instruct the patient to call [phone] to schedule this appointment.        To be discussed with general neurology attending. Case and plan discussed with stroke fellow Dr. Cordoba under supervision of attending Dr. Bowden.    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 Argueta), BIBEMS due to inability to speak or ambulate. Neurological exam with no new focal deficits, as patient continues to have left eye visual field deficit, which has been a residual deficit from prior stroke in 2015. CTH on 6/24/2025 nonacute, CTA H/N with no LVO, CTP with no deficit.     Impression: Transient episode of disorientation and altered mental status with current return to baseline. 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     Recommendations:  - Continue home Eliquis  - Toxic Metabolic workup: UA, UCx, CXR, RVP+COVID, Mg, Phos  - Infectious workup per ED   - If no improvement in dysarthria within 24 hours, obtain vEEG  - No further neurovascular workup required at this time  - Follow up with Dr. Argueta and Dr. Libman at [address] [place] [address] 1-2 weeks after discharge. Please instruct the patient to call [phone] to schedule this appointment.        To be discussed with general neurology attending. Case and plan discussed with stroke fellow Dr. Cordoba under supervision of attending Dr. Bowden.    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 Argueta), BIBEMS due to inability to speak or ambulate. Neurological exam with no new focal deficits, as patient continues to have left eye visual field deficit, which has been a residual deficit from prior stroke in 2015. CTH on 6/24/2025 nonacute, CTA H/N with no LVO, CTP with no deficit.     Impression: Transient episode of disorientation, altered mental status, followed by right wrist drop.  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     Recommendations:  - Continue home Eliquis  - Toxic Metabolic workup: UA, UCx, CXR, RVP+COVID, Mg, Phos  - Infectious workup per ED   - MRI brain without contrast  - Follow up with Dr. Argueta and Dr. Libman at [address] [place] [address] 1-2 weeks after discharge. Please instruct the patient to call [phone] to schedule this appointment.        Seen with neurology attending Dr. Lamb. Case and plan discussed with stroke fellow Dr. Cordoba under supervision of attending Dr. Bowden.

## 2025-06-24 NOTE — CONSULT NOTE ADULT - SUBJECTIVE AND OBJECTIVE BOX
Neurology - Consult Note    -  Spectra: [phone] ([facility]), [phone] ([facility])  -    HPI: [id number] with PMHx of       Review of Systems:  INCOMPLETE   CONSTITUTIONAL: No fevers or chills  EYES AND ENT: No visual changes or no throat pain   NECK: No pain or stiffness  RESPIRATORY: No hemoptysis or shortness of breath  CARDIOVASCULAR: No chest pain or palpitations  GASTROINTESTINAL: No melena or hematochezia  GENITOURINARY: No dysuria or hematuria  NEUROLOGICAL: +As stated in HPI above  SKIN: No itching, burning, rashes, or lesions   All other review of systems is negative unless indicated above.    Allergies:  shellfish (Unknown)  No Known Drug Allergies      PMHx/PSHx/Family Hx: As above, otherwise see below   Hypertension    Hyperlipidemia        Social Hx:  No current use of tobacco, alcohol, or illicit drugs  Lives with ***    Medications:  MEDICATIONS  (STANDING):    MEDICATIONS  (PRN):      Vitals:  T(C): --  HR: 90 (06-24-25 @ 09:49) (90 - 90)  BP: 151/73 (06-24-25 @ 09:49) (151/73 - 151/73)  RR: 16 (06-24-25 @ 09:49) (16 - 16)  SpO2: 98% (06-24-25 @ 09:49) (98% - 98%)    Physical Examination: INCOMPLETE  General - NAD  Cardiovascular - Peripheral pulses palpable, no edema  Eyes - Fundoscopy with flat, sharp optic discs and no hemorrhage or exudates; Fundoscopy not well visualized    Neurologic Exam:  Mental status - Eyes open, attending to examiner. Awake, Alert, Oriented to person, place, and time. Speech fluent, repetition and naming intact. Follows simple and complex commands. Attention/concentration, recent and remote memory (including registration and recall), and fund of knowledge intact    Cranial nerves - PERRL, VFF, EOMI, face sensation (V1-V3) intact b/l, facial strength intact without asymmetry b/l, hearing intact b/l, palate with symmetric elevation, trapezius 5/5 strength b/l, tongue midline on protrusion with full lateral movement    Motor - Normal bulk and tone throughout. No pronator drift.  Strength testing            Deltoid      Biceps      Triceps     Wrist Extension    Wrist Flexion     Interossei         R            5                 5               5                     5                            5                        5                    5  L             5                 5               5                     5                            5                        5                    5              Hip Flexion    Hip Extension    Knee Flexion    Knee Extension    Dorsiflexion    Plantar Flexion  R              5                           5                       5                           5                            5                          5  L              5                           5                        5                           5                            5                          5    Sensation - Light touch intact throughout    DTR's -             Biceps      Triceps     Brachioradialis      Patellar    Ankle    Toes/plantar response  R             2+             2+                  2+                       2+            2+                 Down  L              2+             2+                 2+                        2+           2+                 Down    Coordination - Finger to Nose intact b/l. No tremors appreciated    Gait and station - Normal casual gait. Romberg (-)    Labs:                        12.9   8.03  )-----------( 197      ( 24 Jun 2025 09:52 )             39.8           CAPILLARY BLOOD GLUCOSE      POCT Blood Glucose.: 109 mg/dL (24 Jun 2025 09:49)          CSF:                  Radiology:     Neurology - Consult Note    -  Spectra: [phone] ([facility]), [phone] ([facility])  -    HPI: 89RHF with PMHx of       Review of Systems:  INCOMPLETE   CONSTITUTIONAL: No fevers or chills  EYES AND ENT: No visual changes or no throat pain   NECK: No pain or stiffness  RESPIRATORY: No hemoptysis or shortness of breath  CARDIOVASCULAR: No chest pain or palpitations  GASTROINTESTINAL: No melena or hematochezia  GENITOURINARY: No dysuria or hematuria  NEUROLOGICAL: +As stated in HPI above  SKIN: No itching, burning, rashes, or lesions   All other review of systems is negative unless indicated above.    Allergies:  shellfish (Unknown)  No Known Drug Allergies      PMHx/PSHx/Family Hx: As above, otherwise see below   Hypertension    Hyperlipidemia        Social Hx:  No current use of tobacco, alcohol, or illicit drugs  Lives with ***    Medications:  MEDICATIONS  (STANDING):    MEDICATIONS  (PRN):      Vitals:  T(C): --  HR: 90 (06-24-25 @ 09:49) (90 - 90)  BP: 151/73 (06-24-25 @ 09:49) (151/73 - 151/73)  RR: 16 (06-24-25 @ 09:49) (16 - 16)  SpO2: 98% (06-24-25 @ 09:49) (98% - 98%)    Physical Examination: INCOMPLETE  General - NAD  Cardiovascular - Peripheral pulses palpable, no edema  Eyes - Fundoscopy with flat, sharp optic discs and no hemorrhage or exudates; Fundoscopy not well visualized    Neurologic Exam:  Mental status - Eyes open, attending to examiner. Awake, Alert, Oriented to person, place, and time. Speech fluent, repetition and naming intact. Follows simple and complex commands. Attention/concentration, recent and remote memory (including registration and recall), and fund of knowledge intact    Cranial nerves - PERRL, VFF, EOMI, face sensation (V1-V3) intact b/l, facial strength intact without asymmetry b/l, hearing intact b/l, palate with symmetric elevation, trapezius 5/5 strength b/l, tongue midline on protrusion with full lateral movement    Motor - Normal bulk and tone throughout. No pronator drift.  Strength testing            Deltoid      Biceps      Triceps     Wrist Extension    Wrist Flexion     Interossei         R            5                 5               5                     5                            5                        5                    5  L             5                 5               5                     5                            5                        5                    5              Hip Flexion    Hip Extension    Knee Flexion    Knee Extension    Dorsiflexion    Plantar Flexion  R              5                           5                       5                           5                            5                          5  L              5                           5                        5                           5                            5                          5    Sensation - Light touch intact throughout    DTR's -             Biceps      Triceps     Brachioradialis      Patellar    Ankle    Toes/plantar response  R             2+             2+                  2+                       2+            2+                 Down  L              2+             2+                 2+                        2+           2+                 Down    Coordination - Finger to Nose intact b/l. No tremors appreciated    Gait and station - Normal casual gait. Romberg (-)    Labs:                        12.9   8.03  )-----------( 197      ( 24 Jun 2025 09:52 )             39.8           CAPILLARY BLOOD GLUCOSE      POCT Blood Glucose.: 109 mg/dL (24 Jun 2025 09:49)          CSF:                  Radiology:    MRA Head w/wo Cont (03.03.15 @ 10:30) >  Significant recanalization of the distal basilar as compared   with prior CTA 2/25/2015. Restored continuity between the basilar tip and   the P1 segments is appreciated. Evidence of the distal tip of the basilar   infarcts on the postcontrast MRA. Please note the study is motion   degraded.    Once again fullness is recognized in the distribution of the left MCA   bifurcation. Close interval follow-up recommended.    MRI Head w/o Cont (02.25.15 @ 23:34) >  DWI demonstrates evidence of tip of the basilar type of   insult with bilateral medial temporal and parietal occipital foci of   diffusion hyperintensity along with bilateral thalamic right greater than   left diffusion abnormality with right medial temporal greater than left   medial temporal distribution distribution infarction. No associated   hemorrhage or mass effect at this time at this time.     Neurology - Consult Note    -  Spectra: [phone] ([facility]), [phone] ([facility])  -    HPI: 89RHF with PMHx of       Review of Systems:    CONSTITUTIONAL: No fevers or chills  EYES AND ENT: No visual changes or no throat pain   NECK: No pain or stiffness  RESPIRATORY: No shortness of breath  CARDIOVASCULAR: No chest pain or palpitations  GASTROINTESTINAL: + abdominal pain, no melena or hematochezia  GENITOURINARY: No dysuria or hematuria  NEUROLOGICAL: +As stated in HPI above  SKIN: No itching or burning  All other review of systems is negative unless indicated above.    Allergies:  shellfish (Unknown)  No Known Drug Allergies      PMHx/PSHx/Family Hx: As above, otherwise see below   Hypertension    Hyperlipidemia        Social Hx:  No current use of tobacco, alcohol, or illicit drugs  Lives with     Medications:  MEDICATIONS  (STANDING):    MEDICATIONS  (PRN):      Vitals:  T(C): --  HR: 90 (06-24-25 @ 09:49) (90 - 90)  BP: 151/73 (06-24-25 @ 09:49) (151/73 - 151/73)  RR: 16 (06-24-25 @ 09:49) (16 - 16)  SpO2: 98% (06-24-25 @ 09:49) (98% - 98%)    Physical Examination:  General - NAD  Cardiovascular -  no edema    Neurologic Exam:  Mental status - Eyes open, attending to examiner. Awake, Alert, Oriented to person, place, and time. Speech fluent, repetition and naming intact. Follows simple commands.     Cranial nerves - PERRL, visual fields intact in right eye, unable to test if visual fields are intact in left eye as patient is unable to keep her eye open for testing, EOMI, face sensation (V1-V3) intact b/l, facial strength intact without asymmetry b/l, hearing intact b/l, palate with symmetric elevation, trapezius 5/5 strength b/l, tongue midline on protrusion with full lateral movement    Motor - Normal bulk and tone throughout. No pronator drift.  Strength testing            Deltoid      Biceps      Triceps     Wrist Extension    Wrist Flexion     Interossei         R            5                 5               5                     5                            5                        5                    5  L             5                 5               5                     5                            5                        5                    5              Hip Flexion    Hip Extension    Knee Flexion    Knee Extension    Dorsiflexion    Plantar Flexion  R              5                           5                       5                           5                            5                          5  L              5                           5                        5                           5                            5                          5    Sensation - Light touch intact throughout    DTR's -             Biceps      Triceps     Brachioradialis      Patellar    Ankle    Toes/plantar response  R             2+             2+                  2+                       2+            2+                 Down  L              2+             2+                 2+                        2+           2+                 Down    Coordination - Finger to Nose intact b/l. No tremors appreciated    Gait and station - Normal casual gait. Romberg (-)    Labs:                        12.9   8.03  )-----------( 197      ( 24 Jun 2025 09:52 )             39.8           CAPILLARY BLOOD GLUCOSE      POCT Blood Glucose.: 109 mg/dL (24 Jun 2025 09:49)          CSF:                  Radiology:    MRA Head w/wo Cont (03.03.15 @ 10:30) >  Significant recanalization of the distal basilar as compared   with prior CTA 2/25/2015. Restored continuity between the basilar tip and   the P1 segments is appreciated. Evidence of the distal tip of the basilar   infarcts on the postcontrast MRA. Please note the study is motion   degraded.    Once again fullness is recognized in the distribution of the left MCA   bifurcation. Close interval follow-up recommended.    MRI Head w/o Cont (02.25.15 @ 23:34) >  DWI demonstrates evidence of tip of the basilar type of   insult with bilateral medial temporal and parietal occipital foci of   diffusion hyperintensity along with bilateral thalamic right greater than   left diffusion abnormality with right medial temporal greater than left   medial temporal distribution distribution infarction. No associated   hemorrhage or mass effect at this time at this time.     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 Argueta), BIBEMS due to inability to speak or ambulate. LKW at around 3 AM on 6/24/2025. Daughter at bedside reports that she noted that patient was only making grunting sounds in response to conversation and appeared to have difficulty recognizing her daughter when she saw patient on 6/24/2025 at around 9 AM. However, daughter reports that patient has now returned to baseline. Of note, daughter stated that patient was reporting abdominal pain on 6/23/2025. Ambulates using a cane at baseline, requires assistance with eating and bathing. Reports adherence to Eliquis currently but reportedly had not been taking Eliquis for about 1 month for unknown reasons.       Review of Systems:    CONSTITUTIONAL: No fevers or chills  EYES AND ENT: No visual changes or no throat pain   NECK: No pain or stiffness  RESPIRATORY: No shortness of breath  CARDIOVASCULAR: No chest pain or palpitations  GASTROINTESTINAL: + abdominal pain, no melena or hematochezia  GENITOURINARY: No dysuria or hematuria  NEUROLOGICAL: +As stated in HPI above  SKIN: No itching or burning  All other review of systems is negative unless indicated above.    Allergies:  shellfish (Unknown)  No Known Drug Allergies      PMHx/PSHx/Family Hx: As above, otherwise see below   Hypertension    Hyperlipidemia        Social Hx:  No current use of tobacco, alcohol, or illicit drugs  Lives with     Medications:  MEDICATIONS  (STANDING):    MEDICATIONS  (PRN):      Vitals:  T(C): --  HR: 90 (06-24-25 @ 09:49) (90 - 90)  BP: 151/73 (06-24-25 @ 09:49) (151/73 - 151/73)  RR: 16 (06-24-25 @ 09:49) (16 - 16)  SpO2: 98% (06-24-25 @ 09:49) (98% - 98%)    Physical Examination:  General - NAD  Cardiovascular -  no edema    Neurologic Exam:  Mental status - Eyes open, attending to examiner. Awake, Alert, Oriented to person, place, and time. Speech fluent, repetition and naming intact. Follows simple commands.     Cranial nerves - PERRL, visual fields intact in right eye, unable to test if visual fields are intact in left eye as patient is unable to keep her eye open for testing, EOMI, face sensation (V1-V3) intact b/l, facial strength intact without asymmetry b/l, hearing intact b/l, palate with symmetric elevation, trapezius 5/5 strength b/l, tongue midline on protrusion with full lateral movement    Motor - Normal bulk and tone throughout. No pronator drift.  Strength testing  RUE, LUE: maintains antigravity for at least 10 seconds with no drift  RLE, LLE: maintains antigravity for at least 5 seconds with no drift    Sensation - Light touch intact throughout    Coordination - Finger to Nose intact b/l. No tremors appreciated.    Labs:                        12.9   8.03  )-----------( 197      ( 24 Jun 2025 09:52 )             39.8           CAPILLARY BLOOD GLUCOSE      POCT Blood Glucose.: 109 mg/dL (24 Jun 2025 09:49)          CSF:                  Radiology:    CT Angio Neck Stroke Protocol w/ IV Cont (06.24.25 @ 10:18) >  CT PERFUSION: No perfusion deficit.    CTA NECK AND HEAD: No large vessel occlusion or major stenosis.    CT Brain Stroke Protocol (06.24.25 @ 10:16) >  No acute intracranial hemorrhage, mass effect, or shift of   the midline structures.    Multiple additional unchanged chronic intracranial findings, as discussed.    MRA Head w/wo Cont (03.03.15 @ 10:30) >  Significant recanalization of the distal basilar as compared   with prior CTA 2/25/2015. Restored continuity between the basilar tip and   the P1 segments is appreciated. Evidence of the distal tip of the basilar   infarcts on the postcontrast MRA. Please note the study is motion   degraded.    Once again fullness is recognized in the distribution of the left MCA   bifurcation. Close interval follow-up recommended.    MRI Head w/o Cont (02.25.15 @ 23:34) >  DWI demonstrates evidence of tip of the basilar type of   insult with bilateral medial temporal and parietal occipital foci of   diffusion hyperintensity along with bilateral thalamic right greater than   left diffusion abnormality with right medial temporal greater than left   medial temporal distribution distribution infarction. No associated   hemorrhage or mass effect at this time at this time.     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 Argueta), BIBEMS due to inability to speak or ambulate. LKW at around 3 AM on 6/24/2025. Daughter at bedside reports that she noted that patient was only making grunting sounds in response to conversation and appeared to have difficulty recognizing her daughter when she saw patient on 6/24/2025 at around 9 AM. However, daughter reports that patient has now returned to baseline. Of note, daughter stated that patient was reporting abdominal pain on 6/23/2025. Ambulates using a cane at baseline, requires assistance with eating and bathing. Reports adherence to Eliquis currently but reportedly had not been taking Eliquis for about 1 month for unknown reasons. Translation from Cantonese provided by daughter at bedside per patient and family preference.       Review of Systems:    CONSTITUTIONAL: No fevers or chills  EYES AND ENT: No visual changes or no throat pain   NECK: No pain or stiffness  RESPIRATORY: No shortness of breath  CARDIOVASCULAR: No chest pain or palpitations  GASTROINTESTINAL: + abdominal pain, no melena or hematochezia  GENITOURINARY: No dysuria or hematuria  NEUROLOGICAL: +As stated in HPI above  SKIN: No itching or burning  All other review of systems is negative unless indicated above.    Allergies:  shellfish (Unknown)  No Known Drug Allergies      PMHx/PSHx/Family Hx: As above, otherwise see below   Hypertension    Hyperlipidemia        Social Hx:  No current use of tobacco, alcohol, or illicit drugs  Lives with     Medications:  MEDICATIONS  (STANDING):    MEDICATIONS  (PRN):      Vitals:  T(C): --  HR: 90 (06-24-25 @ 09:49) (90 - 90)  BP: 151/73 (06-24-25 @ 09:49) (151/73 - 151/73)  RR: 16 (06-24-25 @ 09:49) (16 - 16)  SpO2: 98% (06-24-25 @ 09:49) (98% - 98%)    Physical Examination:  General - NAD  Cardiovascular -  no edema    Neurologic Exam:  Mental status - Eyes open, attending to examiner. Awake, Alert, Oriented to person, place, and time. Speech fluent, repetition and naming intact. Follows simple commands.     Cranial nerves - PERRL, visual fields intact in right eye, unable to test if visual fields are intact in left eye as patient is unable to keep her eye open for testing, EOMI, face sensation (V1-V3) intact b/l, facial strength intact without asymmetry b/l, hearing intact b/l, palate with symmetric elevation, trapezius 5/5 strength b/l, tongue midline on protrusion with full lateral movement    Motor - Normal bulk and tone throughout. No pronator drift.  Strength testing  RUE, LUE: maintains antigravity for at least 10 seconds with no drift  RLE, LLE: maintains antigravity for at least 5 seconds with no drift    Sensation - Light touch intact throughout    Coordination - Finger to Nose intact b/l. No tremors appreciated.    Labs:                        12.9   8.03  )-----------( 197      ( 24 Jun 2025 09:52 )             39.8           CAPILLARY BLOOD GLUCOSE      POCT Blood Glucose.: 109 mg/dL (24 Jun 2025 09:49)          CSF:                  Radiology:    CT Angio Neck Stroke Protocol w/ IV Cont (06.24.25 @ 10:18) >  CT PERFUSION: No perfusion deficit.    CTA NECK AND HEAD: No large vessel occlusion or major stenosis.    CT Brain Stroke Protocol (06.24.25 @ 10:16) >  No acute intracranial hemorrhage, mass effect, or shift of   the midline structures.    Multiple additional unchanged chronic intracranial findings, as discussed.    MRA Head w/wo Cont (03.03.15 @ 10:30) >  Significant recanalization of the distal basilar as compared   with prior CTA 2/25/2015. Restored continuity between the basilar tip and   the P1 segments is appreciated. Evidence of the distal tip of the basilar   infarcts on the postcontrast MRA. Please note the study is motion   degraded.    Once again fullness is recognized in the distribution of the left MCA   bifurcation. Close interval follow-up recommended.    MRI Head w/o Cont (02.25.15 @ 23:34) >  DWI demonstrates evidence of tip of the basilar type of   insult with bilateral medial temporal and parietal occipital foci of   diffusion hyperintensity along with bilateral thalamic right greater than   left diffusion abnormality with right medial temporal greater than left   medial temporal distribution distribution infarction. No associated   hemorrhage or mass effect at this time at this time.

## 2025-06-24 NOTE — H&P ADULT - ASSESSMENT
89 female h/o cva, htn, chol, afib on eliquis, here with r/o cva    r/o new cva  neuro eval noted  CT noted  pending MRI  serial neuro exams    h/o old cva  cont statin    uti  ceftriax  f/u cult and sens    afib  AC with eliquis  rate control     cont other home meds      PT    I reviewed the course of events on the unit, re-confirming the patient history.   I discussed the care with the patient and their family. The plan of care was discussed with the ACP team.  Advanced care planning was discussed with patient and family.  Advanced care planning forms were reviewed and discussed as appropriate.  Differential diagnosis and plan of care discussed with patient after the evaluation.   Pain assessed and judicious use of narcotics when appropriate was discussed.  Importance of Fall prevention discussed.  Counseling on Smoking and Alcohol cessation was offered when appropriate.  Counseling on Diet, exercise, and medication compliance was done.

## 2025-06-24 NOTE — ED ADULT NURSE REASSESSMENT NOTE - COMFORT CARE
pillow provided/repositioned/side rails up
plan of care explained/side rails up/wait time explained/warm blanket provided
plan of care explained/side rails up/wait time explained

## 2025-06-25 LAB
ANION GAP SERPL CALC-SCNC: 12 MMOL/L — SIGNIFICANT CHANGE UP (ref 5–17)
BUN SERPL-MCNC: 11 MG/DL — SIGNIFICANT CHANGE UP (ref 7–23)
CALCIUM SERPL-MCNC: 8.9 MG/DL — SIGNIFICANT CHANGE UP (ref 8.4–10.5)
CHLORIDE SERPL-SCNC: 106 MMOL/L — SIGNIFICANT CHANGE UP (ref 96–108)
CO2 SERPL-SCNC: 24 MMOL/L — SIGNIFICANT CHANGE UP (ref 22–31)
CREAT SERPL-MCNC: 0.81 MG/DL — SIGNIFICANT CHANGE UP (ref 0.5–1.3)
EGFR: 69 ML/MIN/1.73M2 — SIGNIFICANT CHANGE UP
EGFR: 69 ML/MIN/1.73M2 — SIGNIFICANT CHANGE UP
GLUCOSE SERPL-MCNC: 96 MG/DL — SIGNIFICANT CHANGE UP (ref 70–99)
HCT VFR BLD CALC: 39.7 % — SIGNIFICANT CHANGE UP (ref 34.5–45)
HGB BLD-MCNC: 12.7 G/DL — SIGNIFICANT CHANGE UP (ref 11.5–15.5)
MCHC RBC-ENTMCNC: 30.2 PG — SIGNIFICANT CHANGE UP (ref 27–34)
MCHC RBC-ENTMCNC: 32 G/DL — SIGNIFICANT CHANGE UP (ref 32–36)
MCV RBC AUTO: 94.3 FL — SIGNIFICANT CHANGE UP (ref 80–100)
NRBC # BLD AUTO: 0 K/UL — SIGNIFICANT CHANGE UP (ref 0–0)
NRBC # FLD: 0 K/UL — SIGNIFICANT CHANGE UP (ref 0–0)
NRBC BLD AUTO-RTO: 0 /100 WBCS — SIGNIFICANT CHANGE UP (ref 0–0)
PLATELET # BLD AUTO: 194 K/UL — SIGNIFICANT CHANGE UP (ref 150–400)
PMV BLD: 10.9 FL — SIGNIFICANT CHANGE UP (ref 7–13)
POTASSIUM SERPL-MCNC: 3.5 MMOL/L — SIGNIFICANT CHANGE UP (ref 3.5–5.3)
POTASSIUM SERPL-SCNC: 3.5 MMOL/L — SIGNIFICANT CHANGE UP (ref 3.5–5.3)
RBC # BLD: 4.21 M/UL — SIGNIFICANT CHANGE UP (ref 3.8–5.2)
RBC # FLD: 12.6 % — SIGNIFICANT CHANGE UP (ref 10.3–14.5)
SODIUM SERPL-SCNC: 142 MMOL/L — SIGNIFICANT CHANGE UP (ref 135–145)
WBC # BLD: 7.04 K/UL — SIGNIFICANT CHANGE UP (ref 3.8–10.5)
WBC # FLD AUTO: 7.04 K/UL — SIGNIFICANT CHANGE UP (ref 3.8–10.5)

## 2025-06-25 PROCEDURE — 70551 MRI BRAIN STEM W/O DYE: CPT

## 2025-06-25 PROCEDURE — 80053 COMPREHEN METABOLIC PANEL: CPT

## 2025-06-25 PROCEDURE — 84484 ASSAY OF TROPONIN QUANT: CPT

## 2025-06-25 PROCEDURE — 82962 GLUCOSE BLOOD TEST: CPT

## 2025-06-25 PROCEDURE — 85018 HEMOGLOBIN: CPT

## 2025-06-25 PROCEDURE — 99233 SBSQ HOSP IP/OBS HIGH 50: CPT

## 2025-06-25 PROCEDURE — 84132 ASSAY OF SERUM POTASSIUM: CPT

## 2025-06-25 PROCEDURE — 83605 ASSAY OF LACTIC ACID: CPT

## 2025-06-25 PROCEDURE — 0042T: CPT

## 2025-06-25 PROCEDURE — 85610 PROTHROMBIN TIME: CPT

## 2025-06-25 PROCEDURE — 84295 ASSAY OF SERUM SODIUM: CPT

## 2025-06-25 PROCEDURE — 85014 HEMATOCRIT: CPT

## 2025-06-25 PROCEDURE — 85025 COMPLETE CBC W/AUTO DIFF WBC: CPT

## 2025-06-25 PROCEDURE — 82947 ASSAY GLUCOSE BLOOD QUANT: CPT

## 2025-06-25 PROCEDURE — 87086 URINE CULTURE/COLONY COUNT: CPT

## 2025-06-25 PROCEDURE — 82803 BLOOD GASES ANY COMBINATION: CPT

## 2025-06-25 PROCEDURE — 70498 CT ANGIOGRAPHY NECK: CPT

## 2025-06-25 PROCEDURE — 70450 CT HEAD/BRAIN W/O DYE: CPT

## 2025-06-25 PROCEDURE — 70551 MRI BRAIN STEM W/O DYE: CPT | Mod: 26

## 2025-06-25 PROCEDURE — 81001 URINALYSIS AUTO W/SCOPE: CPT

## 2025-06-25 PROCEDURE — 85027 COMPLETE CBC AUTOMATED: CPT

## 2025-06-25 PROCEDURE — 82435 ASSAY OF BLOOD CHLORIDE: CPT

## 2025-06-25 PROCEDURE — 85730 THROMBOPLASTIN TIME PARTIAL: CPT

## 2025-06-25 PROCEDURE — 82330 ASSAY OF CALCIUM: CPT

## 2025-06-25 PROCEDURE — 80048 BASIC METABOLIC PNL TOTAL CA: CPT

## 2025-06-25 PROCEDURE — 70496 CT ANGIOGRAPHY HEAD: CPT

## 2025-06-25 RX ORDER — MIRTAZAPINE 30 MG/1
7.5 TABLET, FILM COATED ORAL AT BEDTIME
Refills: 0 | Status: DISCONTINUED | OUTPATIENT
Start: 2025-06-25 | End: 2025-06-26

## 2025-06-25 RX ADMIN — CEFTRIAXONE 100 MILLIGRAM(S): 500 INJECTION, POWDER, FOR SOLUTION INTRAMUSCULAR; INTRAVENOUS at 12:55

## 2025-06-25 RX ADMIN — MIRTAZAPINE 7.5 MILLIGRAM(S): 30 TABLET, FILM COATED ORAL at 21:11

## 2025-06-25 RX ADMIN — APIXABAN 2.5 MILLIGRAM(S): 2.5 TABLET, FILM COATED ORAL at 05:32

## 2025-06-25 RX ADMIN — ROSUVASTATIN CALCIUM 20 MILLIGRAM(S): 5 TABLET, FILM COATED ORAL at 21:11

## 2025-06-25 RX ADMIN — METOPROLOL SUCCINATE 100 MILLIGRAM(S): 50 TABLET, EXTENDED RELEASE ORAL at 05:32

## 2025-06-25 RX ADMIN — LOSARTAN POTASSIUM 100 MILLIGRAM(S): 100 TABLET, FILM COATED ORAL at 05:32

## 2025-06-25 RX ADMIN — APIXABAN 2.5 MILLIGRAM(S): 2.5 TABLET, FILM COATED ORAL at 17:11

## 2025-06-25 NOTE — PROGRESS NOTE ADULT - ASSESSMENT
ASSESSMENT: 88 y/o F w/ PMH of HTN, HLD, ischemic stroke in 2015 involving the b/l occipital lobes, right posteromedial temporal lobe, and right subcortical structures c/b left vision loss, afib on Eliquis, and cognitive decline who was found this AM by her daughter to have no verbal output and confusion. This AM, the patient was complaining of abdominal pain and today the patient's daughter saw her at 9AM and noticed that the patient seemed confused and would only grunt when spoken to. EMS was called and stroke code activated. Stroke workup including CTA and CTP unremarkable and the patient returned to baseline. The patient was found to have a UTI while in the ED and started on IV Abx. While she was getting the infusion she started slurring her speech again so Neurology was called for further evaluation. 6/25 - Patient seen and examined by neurology today, with daughter present at bedside providing Cantonese translation. Patient found to have UTI at time of admission, now being treated w/ Ceftriaxone. Daughter reports improvement in pt mental status with ongoing treatment of UTI. Dysarthria resolved, confusion improving. Of note, pt continues to report R hand weakness. On exam, entire R side notably weaker than L, RUE>RLE, + RUE drift. Pending MRI brain to r/o new infarct depending on PPM compatibility.     IMPRESSION: Transient episode of disorientation and altered mental status now improving with treatment of UTI. Episode also followed by right wrist drop. On reexamination 6/25, entire R side noted to be weaker than L, + RUE drift. Etiology likely toxic-metabolic encephalopathy vs acute embolic stroke to the hand knob, less likely recrudescence of prior stroke. Pending MRI brain w/o for further evaluation.    RECOMMENDATIONS:  -MRI brain w/o contrast depending on PPM compatibility  -Would continue to hold off on EEG for now as lower yield  -Continue with home Eliquis for secondary stroke prevention  -UTI as per primary team  -Continue to address above medical issues, as you are doing  -Above recommendations discussed with primary team, who verbalized agreement and understanding of the plan    Neurology to continue to follow patient with you. Thank you [phone].   ASSESSMENT: 88 y/o F w/ PMH of HTN, HLD, ischemic stroke in 2015 involving the b/l occipital lobes, right posteromedial temporal lobe, and right subcortical structures c/b left vision loss, afib on Eliquis, and cognitive decline who was found this AM by her daughter to have no verbal output and confusion. This AM, the patient was complaining of abdominal pain and today the patient's daughter saw her at 9AM and noticed that the patient seemed confused and would only grunt when spoken to. EMS was called and stroke code activated. Stroke workup including CTA and CTP unremarkable and the patient returned to baseline. The patient was found to have a UTI while in the ED and started on IV Abx. While she was getting the infusion she started slurring her speech again so Neurology was called for further evaluation. 6/25 - Patient seen and examined by neurology today, with daughter present at bedside providing Cantonese translation. Patient found to have UTI at time of admission, now being treated w/ Ceftriaxone. Daughter reports improvement in pt mental status with ongoing treatment of UTI. Dysarthria resolved, confusion improving. Of note, pt continues to report R hand weakness. On exam, entire R side notably weaker than L, RUE>RLE, + RUE drift. Pending MRI brain to r/o new infarct.    IMPRESSION: Transient episode of disorientation and altered mental status now improving with treatment of UTI. Episode also followed by right wrist drop. On reexamination 6/25, entire R side noted to be weaker than L, + RUE drift. Etiology likely toxic-metabolic encephalopathy vs acute embolic stroke to the hand knob, less likely recrudescence of prior stroke. Pending MRI brain w/o for further evaluation.    RECOMMENDATIONS:  -MRI brain w/o contrast to r/o acute infarct  -Would continue to hold off on EEG for now as lower yield  -Continue with home Eliquis for secondary stroke prevention  -UTI as per primary team  -Continue to address above medical issues, as you are doing  -Above recommendations discussed with primary team, who verbalized agreement and understanding of the plan    Neurology to continue to follow patient with you. Thank you [phone].

## 2025-06-25 NOTE — PROGRESS NOTE ADULT - SUBJECTIVE AND OBJECTIVE BOX
NEUROLOGY FOLLOW-UP CONSULT NOTE    RFC: Code stroke for AMS    Interval history: No acute neurologic events overnight. Patient seen and examined by neurology today, with daughter present at bedside providing Cantonese translation. Patient found to have UTI at time of admission, now being treated w/ Ceftriaxone. Daughter reports improvement in pt mental status with ongoing treatment of UTI. Dysarthria resolved, confusion improving. Of note, pt continues to report R hand weakness. On exam, entire R side notably weaker than L, RUE>RLE, + RUE drift. Pending MRI brain to r/o new infarct depending on PPM compatibility.     Meds:  MEDICATIONS  (STANDING):  apixaban 2.5 milliGRAM(s) Oral every 12 hours  cefTRIAXone   IVPB 1000 milliGRAM(s) IV Intermittent every 24 hours  losartan 100 milliGRAM(s) Oral daily  metoprolol succinate  milliGRAM(s) Oral daily  mirtazapine 15 milliGRAM(s) Oral at bedtime  rosuvastatin 20 milliGRAM(s) Oral at bedtime    MEDICATIONS  (PRN):      Allergies:  shellfish (Unknown)  No Known Drug Allergies      ROS: All systems negative except as documented in Interval history    O:  T(C): 36.6 (06-25-25 @ 09:49), Max: 36.8 (06-25-25 @ 00:17)  HR: 68 (06-25-25 @ 09:49) (68 - 84)  BP: 109/72 (06-25-25 @ 09:49) (107/69 - 126/70)  RR: 18 (06-25-25 @ 09:49) (16 - 20)  SpO2: 95% (06-25-25 @ 09:49) (93% - 98%)    Focused neurologic exam:  MS - AAO x2, speech fluent, rep/naming intact, follows commands  CN - PERRL, visual fields intact in right eye, unable to test if visual fields are intact in left eye as patient is unable to keep her eye open for testing, EOMI, face sensation (V1-V3) intact b/l, facial strength intact without asymmetry b/l, hearing intact b/l, palate with symmetric elevation, trapezius 5/5 strength b/l, tongue midline on protrusion with full lateral movement  Motor - Normal bulk/tone, strength 5/5 entire L side, RUE 3/5, RLE 4/5, RUE drift, pronator drift. R wrist drop  Sens - LT intact all  DTR's - Neutral b/l plantar response  Coord - Coordination grossly intact for level of strength, no tremors appreciated  Gait and station - Due to fall risk/safety concerns did not assess    Pertinent labs/studies:  .  LABS:                         12.7   7.04  )-----------( 194      ( 25 Jun 2025 05:47 )             39.7     06-25    142  |  106  |  11  ----------------------------<  96  3.5   |  24  |  0.81    Ca    8.9      25 Jun 2025 05:47    TPro  7.0  /  Alb  4.1  /  TBili  1.0  /  DBili  x   /  AST  29  /  ALT  29  /  AlkPhos  66  06-24    PT/INR - ( 24 Jun 2025 09:52 )   PT: 16.2 sec;   INR: 1.43 ratio         PTT - ( 24 Jun 2025 09:52 )  PTT:38.9 sec  Urinalysis Basic - ( 25 Jun 2025 05:47 )    Color: x / Appearance: x / SG: x / pH: x  Gluc: 96 mg/dL / Ketone: x  / Bili: x / Urobili: x   Blood: x / Protein: x / Nitrite: x   Leuk Esterase: x / RBC: x / WBC x   Sq Epi: x / Non Sq Epi: x / Bacteria: x      RADIOLOGY, EKG & ADDITIONAL TESTS: Reviewed.     < from: CT Angio Neck Stroke Protocol w/ IV Cont (06.24.25 @ 10:18) >    IMPRESSION:    CT PERFUSION: No perfusion deficit.    CTA NECK AND HEAD: No large vessel occlusion or major stenosis.    --- End of Report ---    < end of copied text >    < from: CT Brain Stroke Protocol (06.24.25 @ 10:16) >    FINDINGS: There is no acute intracranial hemorrhage, mass effect, shift   of the midline structures, herniation, extra-axial fluid collection, or   hydrocephalus.    Chronic infarcts are seen within the bilateral occipital lobes and right   posteromedial temporal lobe. There is also a chronic lacunar infarct   within the right thalamus and also within the right basal ganglia. A   chronic infarct within the right paramedian midbrain is seen.    There is mild diffuse cerebral volume loss with prominence of the sulci,   fissures, and cisternal spaces which is normal for the patient's age.   There is mild deep and periventricular white matter hypoattenuation   statistically compatible with microvascular changes given calcific   atherosclerotic disease of the intracranial arteries.    Minor scattered mucosal thickening is seen throughout the paranasal   sinuses. The tympanomastoid cavities are clear. The calvarium is intact.   There is evidence of bilateral cataract removal.    IMPRESSION: No acute intracranial hemorrhage, mass effect, or shift of   the midline structures.    Multiple additional unchanged chronic intracranial findings, as discussed.    --- End of Report ---    < end of copied text >   NEUROLOGY FOLLOW-UP CONSULT NOTE    RFC: Code stroke for AMS    Interval history: No acute neurologic events overnight. Patient seen and examined by neurology today, with daughter present at bedside providing Cantonese translation. Patient found to have UTI at time of admission, now being treated w/ Ceftriaxone. Daughter reports improvement in pt mental status with ongoing treatment of UTI. Dysarthria resolved, confusion improving. Of note, pt continues to report R hand weakness. On exam, entire R side notably weaker than L, RUE>RLE, + RUE drift. Pending MRI brain to r/o new infarct.    Meds:  MEDICATIONS  (STANDING):  apixaban 2.5 milliGRAM(s) Oral every 12 hours  cefTRIAXone   IVPB 1000 milliGRAM(s) IV Intermittent every 24 hours  losartan 100 milliGRAM(s) Oral daily  metoprolol succinate  milliGRAM(s) Oral daily  mirtazapine 15 milliGRAM(s) Oral at bedtime  rosuvastatin 20 milliGRAM(s) Oral at bedtime    MEDICATIONS  (PRN):      Allergies:  shellfish (Unknown)  No Known Drug Allergies      ROS: All systems negative except as documented in Interval history    O:  T(C): 36.6 (06-25-25 @ 09:49), Max: 36.8 (06-25-25 @ 00:17)  HR: 68 (06-25-25 @ 09:49) (68 - 84)  BP: 109/72 (06-25-25 @ 09:49) (107/69 - 126/70)  RR: 18 (06-25-25 @ 09:49) (16 - 20)  SpO2: 95% (06-25-25 @ 09:49) (93% - 98%)    Focused neurologic exam:  MS - AAO x2, speech fluent, rep/naming intact, follows commands  CN - PERRL, visual fields intact in right eye, unable to test if visual fields are intact in left eye as patient is unable to keep her eye open for testing, EOMI, face sensation (V1-V3) intact b/l, facial strength intact without asymmetry b/l, hearing intact b/l, palate with symmetric elevation, trapezius 5/5 strength b/l, tongue midline on protrusion with full lateral movement  Motor - Normal bulk/tone, strength 5/5 entire L side, RUE 3/5, RLE 4/5, RUE drift, pronator drift. R wrist drop  Sens - LT intact all  DTR's - Neutral b/l plantar response  Coord - Coordination grossly intact for level of strength, no tremors appreciated  Gait and station - Due to fall risk/safety concerns did not assess    Pertinent labs/studies:  .  LABS:                         12.7   7.04  )-----------( 194      ( 25 Jun 2025 05:47 )             39.7     06-25    142  |  106  |  11  ----------------------------<  96  3.5   |  24  |  0.81    Ca    8.9      25 Jun 2025 05:47    TPro  7.0  /  Alb  4.1  /  TBili  1.0  /  DBili  x   /  AST  29  /  ALT  29  /  AlkPhos  66  06-24    PT/INR - ( 24 Jun 2025 09:52 )   PT: 16.2 sec;   INR: 1.43 ratio         PTT - ( 24 Jun 2025 09:52 )  PTT:38.9 sec  Urinalysis Basic - ( 25 Jun 2025 05:47 )    Color: x / Appearance: x / SG: x / pH: x  Gluc: 96 mg/dL / Ketone: x  / Bili: x / Urobili: x   Blood: x / Protein: x / Nitrite: x   Leuk Esterase: x / RBC: x / WBC x   Sq Epi: x / Non Sq Epi: x / Bacteria: x      RADIOLOGY, EKG & ADDITIONAL TESTS: Reviewed.     < from: CT Angio Neck Stroke Protocol w/ IV Cont (06.24.25 @ 10:18) >    IMPRESSION:    CT PERFUSION: No perfusion deficit.    CTA NECK AND HEAD: No large vessel occlusion or major stenosis.    --- End of Report ---    < end of copied text >    < from: CT Brain Stroke Protocol (06.24.25 @ 10:16) >    FINDINGS: There is no acute intracranial hemorrhage, mass effect, shift   of the midline structures, herniation, extra-axial fluid collection, or   hydrocephalus.    Chronic infarcts are seen within the bilateral occipital lobes and right   posteromedial temporal lobe. There is also a chronic lacunar infarct   within the right thalamus and also within the right basal ganglia. A   chronic infarct within the right paramedian midbrain is seen.    There is mild diffuse cerebral volume loss with prominence of the sulci,   fissures, and cisternal spaces which is normal for the patient's age.   There is mild deep and periventricular white matter hypoattenuation   statistically compatible with microvascular changes given calcific   atherosclerotic disease of the intracranial arteries.    Minor scattered mucosal thickening is seen throughout the paranasal   sinuses. The tympanomastoid cavities are clear. The calvarium is intact.   There is evidence of bilateral cataract removal.    IMPRESSION: No acute intracranial hemorrhage, mass effect, or shift of   the midline structures.    Multiple additional unchanged chronic intracranial findings, as discussed.    --- End of Report ---    < end of copied text >

## 2025-06-25 NOTE — PROGRESS NOTE ADULT - SUBJECTIVE AND OBJECTIVE BOX
KYLIE BENJAMIN  89y Female  MRN:[id number]    Patient is a 89y old  Female who presents with a chief complaint of slurred speech    HPI:   89 yo female, with PMHx of HTN, HLD, prior stroke in 2015 with residual left eye vision loss (bilateral occipital lobes and right posteromedial temporal lobe, right thalamic lacunar, right basal ganglia, right paramedian midbrain stroke), Afib (on Eliquis), cognitive decline (follows with Dr. Argueta), BIBEMS due to inability to speak or ambulate. LKW at around 3 AM on 6/24/2025. Daughter at bedside reports that she noted that patient was only making grunting sounds in response to conversation and appeared to have difficulty recognizing her daughter when she saw patient on 6/24/2025 at around 9 AM. However, daughter reports that patient has now returned to baseline. Of note, daughter stated that patient was reporting abdominal pain on 6/23/2025. Ambulates using a cane at baseline, requires assistance with eating and bathing. Reports adherence to Eliquis currently but reportedly had not been taking Eliquis for about 1 month for unknown reasons. Translation from Cantonese provided by daughter at bedside per patient and family preference.  (24 Jun 2025 18:58)      Patient seen and evaluated at bedside. No acute events overnight except as noted.    Interval HPI: no acute events o/n     PAST MEDICAL & SURGICAL HISTORY:  Hypertension      Hyperlipidemia      CVA (cerebral vascular accident)      Afib      No significant past surgical history          REVIEW OF SYSTEMS:  as per hpi       VITALS:  Vital Signs Last 24 Hrs  T(C): 36.6 (25 Jun 2025 09:49), Max: 36.8 (25 Jun 2025 00:17)  T(F): 97.9 (25 Jun 2025 09:49), Max: 98.2 (25 Jun 2025 00:17)  HR: 68 (25 Jun 2025 09:49) (68 - 84)  BP: 109/72 (25 Jun 2025 09:49) (107/69 - 126/70)  BP(mean): 85 (24 Jun 2025 18:40) (85 - 94)  RR: 18 (25 Jun 2025 09:49) (16 - 20)  SpO2: 95% (25 Jun 2025 09:49) (93% - 98%)    Parameters below as of 25 Jun 2025 09:49  Patient On (Oxygen Delivery Method): room air      CAPILLARY BLOOD GLUCOSE        I&O's Summary    25 Jun 2025 07:01  -  25 Jun 2025 12:25  --------------------------------------------------------  IN: 380 mL / OUT: 0 mL / NET: 380 mL        PHYSICAL EXAM:  GENERAL: NAD, well-developed  HEAD:  Atraumatic, Normocephalic  EYES: EOMI, PERRLA, conjunctiva and sclera clear  NECK: Supple, No JVD  CHEST/LUNG: Clear to auscultation bilaterally; No wheeze  HEART: S1, S2; No murmurs, rubs, or gallops  ABDOMEN: Soft, Nontender, Nondistended; Bowel sounds present  EXTREMITIES:  2+ Peripheral Pulses, No clubbing, cyanosis, or edema  PSYCH: Normal affect  NEUROLOGY: AAOX3; +dysarthria   SKIN: No rashes or lesions    Consultant(s) Notes Reviewed:  [x ] YES  [ ] NO  Care Discussed with Consultants/Other Providers [ x] YES  [ ] NO    MEDS:  MEDICATIONS  (STANDING):  apixaban 2.5 milliGRAM(s) Oral every 12 hours  cefTRIAXone   IVPB 1000 milliGRAM(s) IV Intermittent every 24 hours  losartan 100 milliGRAM(s) Oral daily  metoprolol succinate  milliGRAM(s) Oral daily  mirtazapine 15 milliGRAM(s) Oral at bedtime  rosuvastatin 20 milliGRAM(s) Oral at bedtime    MEDICATIONS  (PRN):    ALLERGIES:  shellfish (Unknown)  No Known Drug Allergies      LABS:                        12.7   7.04  )-----------( 194      ( 25 Jun 2025 05:47 )             39.7     06-25    142  |  106  |  11  ----------------------------<  96  3.5   |  24  |  0.81    Ca    8.9      25 Jun 2025 05:47    TPro  7.0  /  Alb  4.1  /  TBili  1.0  /  DBili  x   /  AST  29  /  ALT  29  /  AlkPhos  66  06-24    PT/INR - ( 24 Jun 2025 09:52 )   PT: 16.2 sec;   INR: 1.43 ratio         PTT - ( 24 Jun 2025 09:52 )  PTT:38.9 sec      LIVER FUNCTIONS - ( 24 Jun 2025 09:52 )  Alb: 4.1 g/dL / Pro: 7.0 g/dL / ALK PHOS: 66 U/L / ALT: 29 U/L / AST: 29 U/L / GGT: x           Urinalysis Basic - ( 25 Jun 2025 05:47 )    Color: x / Appearance: x / SG: x / pH: x  Gluc: 96 mg/dL / Ketone: x  / Bili: x / Urobili: x   Blood: x / Protein: x / Nitrite: x   Leuk Esterase: x / RBC: x / WBC x   Sq Epi: x / Non Sq Epi: x / Bacteria: x     < from: CT Angio Neck Stroke Protocol w/ IV Cont (06.24.25 @ 10:18) >  IMPRESSION:    CT PERFUSION: No perfusion deficit.    CTA NECK AND HEAD: No large vessel occlusion or major stenosis.    --- End of Report ---      < end of copied text >

## 2025-06-26 ENCOUNTER — RESULT REVIEW (OUTPATIENT)
Age: 89
End: 2025-06-26

## 2025-06-26 PROCEDURE — 93306 TTE W/DOPPLER COMPLETE: CPT | Mod: 26

## 2025-06-26 PROCEDURE — 99233 SBSQ HOSP IP/OBS HIGH 50: CPT

## 2025-06-26 RX ORDER — ACETAMINOPHEN 500 MG/5ML
725 LIQUID (ML) ORAL ONCE
Refills: 0 | Status: COMPLETED | OUTPATIENT
Start: 2025-06-26 | End: 2025-06-26

## 2025-06-26 RX ORDER — MIRTAZAPINE 30 MG/1
15 TABLET, FILM COATED ORAL AT BEDTIME
Refills: 0 | Status: DISCONTINUED | OUTPATIENT
Start: 2025-06-26 | End: 2025-07-04

## 2025-06-26 RX ORDER — ACETAMINOPHEN 500 MG/5ML
650 LIQUID (ML) ORAL ONCE
Refills: 0 | Status: COMPLETED | OUTPATIENT
Start: 2025-06-26 | End: 2025-06-26

## 2025-06-26 RX ADMIN — METOPROLOL SUCCINATE 100 MILLIGRAM(S): 50 TABLET, EXTENDED RELEASE ORAL at 08:57

## 2025-06-26 RX ADMIN — Medication 650 MILLIGRAM(S): at 04:45

## 2025-06-26 RX ADMIN — CEFTRIAXONE 100 MILLIGRAM(S): 500 INJECTION, POWDER, FOR SOLUTION INTRAMUSCULAR; INTRAVENOUS at 12:16

## 2025-06-26 RX ADMIN — LOSARTAN POTASSIUM 100 MILLIGRAM(S): 100 TABLET, FILM COATED ORAL at 12:16

## 2025-06-26 RX ADMIN — APIXABAN 2.5 MILLIGRAM(S): 2.5 TABLET, FILM COATED ORAL at 20:56

## 2025-06-26 RX ADMIN — Medication 290 MILLIGRAM(S): at 21:00

## 2025-06-26 RX ADMIN — Medication 650 MILLIGRAM(S): at 03:45

## 2025-06-26 RX ADMIN — MIRTAZAPINE 15 MILLIGRAM(S): 30 TABLET, FILM COATED ORAL at 21:00

## 2025-06-26 RX ADMIN — ROSUVASTATIN CALCIUM 20 MILLIGRAM(S): 5 TABLET, FILM COATED ORAL at 21:00

## 2025-06-26 RX ADMIN — APIXABAN 2.5 MILLIGRAM(S): 2.5 TABLET, FILM COATED ORAL at 08:57

## 2025-06-26 NOTE — PHYSICAL THERAPY INITIAL EVALUATION ADULT - PERTINENT HX OF CURRENT PROBLEM, REHAB EVAL
Pt is 89F admitted 6/24/25 PMHx HTN, HLD, prior stroke in 2015 with residual left eye vision loss (bilateral occipital lobes and right posteromedial temporal lobe, right thalamic lacunar, right basal ganglia, right paramedian midbrain stroke), Afib (on Eliquis), cognitive decline (follows with Dr. Argueta), BIBEMS due to inability to speak or ambulate. LKW at around 3 AM on 6/24/2025. Daughter at bedside reports that she noted that patient was only making grunting sounds in response to conversation and appeared to have difficulty recognizing her daughter when she saw patient on 6/24/2025 at around 9 AM. However, daughter reports that patient has now returned to baseline. Of note, daughter stated that patient was reporting abdominal pain on 6/23/2025. Ambulates using a cane at baseline, requires assistance with eating and bathing. Reports adherence to Eliquis currently but reportedly had not been taking Eliquis for about 1 month for unknown reasons. Translation from Cantonese provided by daughter at bedside per patient and family preference.       MR HEAD: Acute left MCA territory infarctions

## 2025-06-26 NOTE — PATIENT PROFILE ADULT - NSPROPOAURINARYCATHETER_GEN_A_NUR
Care Transition Team Assessment    IHD met with patient bedside. She stated she lvies with her hsuabd and she uses a cane normally at home. She is normally able to drive herself, and stated she does not use any home O2 or HHC and does not expect to discharge with any new services.     Information Source  Orientation : Oriented x 4  Information Given By: Patient  Informant's Name: Donte Magaña  Who is responsible for making decisions for patient? : Patient    Readmission Evaluation  Is this a readmission?: No    Elopement Risk  Legal Hold: No  Ambulatory or Self Mobile in Wheelchair: Yes  Disoriented: No  Psychiatric Symptoms: None  History of Wandering: No  Elopement this Admit: No  Vocalizing Wanting to Leave: No  Displays Behaviors, Body Language Wanting to Leave: No-Not at Risk for Elopement  Elopement Risk: Not at Risk for Elopement    Interdisciplinary Discharge Planning  Does Admitting Nurse Feel This Could be a Complex Discharge?: No  Primary Care Physician: Dr. Kishore Price  Lives with - Patient's Self Care Capacity: Spouse  Patient or legal guardian wants to designate a caregiver (see row info): No  Support Systems: Children, Spouse / Significant Other  Housing / Facility: [address] [facility]  Do You Take your Prescribed Medications Regularly: Yes  Able to Return to Previous ADL's: Yes  Mobility Issues: Yes (weakness)  Prior Services: None  Patient Expects to be Discharged to:: home  Assistance Needed: Yes  Durable Medical Equipment: Walker, Home Oxygen    Discharge Preparedness  What is your plan after discharge?: Home with help  What are your discharge supports?: Spouse  Prior Functional Level: Ambulatory, Drives Self, Independent with Activities of Daily Living, Independent with Medication Management, Uses Walker, Uses Cane  Difficulity with ADLs: Walking  Difficulty with ADLs Comment: Uses cane or walker as needed  Difficulity with IADLs: Driving  Difficulity with IADL Comments:  drives at  night    Functional Assesment  Prior Functional Level: Ambulatory, Drives Self, Independent with Activities of Daily Living, Independent with Medication Management, Uses Walker, Uses Cane    Finances  Financial Barriers to Discharge: No  Prescription Coverage: Yes ([facility])    Vision / Hearing Impairment  Vision Impairment : Yes  Right Eye Vision: Impaired, Wears Glasses  Left Eye Vision: Impaired, Wears Glasses  Hearing Impairment : No    Values / Beliefs / Concerns  Values / Beliefs Concerns : No  Special Hospitalization Concerns: N/A    Advance Directive  Advance Directive?: None    Domestic Abuse  Have you ever been the victim of abuse or violence?: No  Physical Abuse or Sexual Abuse: No  Verbal Abuse or Emotional Abuse: No  Possible Abuse Reported to:: Not Applicable    Psychological Assessment  History of Substance Abuse: None  History of Psychiatric Problems: No  Non-compliant with Treatment: No  Newly Diagnosed Illness: No    Discharge Risks or Barriers  Discharge risks or barriers?: No    Anticipated Discharge Information  Anticipated discharge disposition: Discharge needs currently unknown  Discharge Address: [address] [place] [id number]  Discharge Contact Phone Number: [phone]         no

## 2025-06-26 NOTE — PROGRESS NOTE ADULT - SUBJECTIVE AND OBJECTIVE BOX
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 Argueta), BIBEMS due to inability to speak or ambulate. LKW at around 3 AM on 6/24/2025. Daughter at bedside reports that she noted that patient was only making grunting sounds in response to conversation and appeared to have difficulty recognizing her daughter when she saw patient on 6/24/2025 at around 9 AM. However, daughter reports that patient has now returned to baseline. Of note, daughter stated that patient was reporting abdominal pain on 6/23/2025. Ambulates using a cane at baseline, requires assistance with eating and bathing. Reports adherence to Eliquis currently but reportedly had not been taking Eliquis for about 1 month for unknown reasons. Translation from Cantonese provided by daughter at bedside per patient and family preference.  (24 Jun 2025 18:58)      Patient seen and evaluated at bedside. No acute events overnight except as noted.    Interval HPI: no acute events o/n     PAST MEDICAL & SURGICAL HISTORY:  Hypertension      Hyperlipidemia      CVA (cerebral vascular accident)      Afib      No significant past surgical history          REVIEW OF SYSTEMS:  as per hpi       VITALS:   Vital Signs Last 24 Hrs  T(C): 36.4 (26 Jun 2025 11:30), Max: 36.8 (25 Jun 2025 20:27)  T(F): 97.5 (26 Jun 2025 11:30), Max: 98.2 (25 Jun 2025 20:27)  HR: 80 (26 Jun 2025 11:30) (55 - 84)  BP: 115/70 (26 Jun 2025 11:30) (92/58 - 115/75)  BP(mean): --  RR: 18 (26 Jun 2025 11:30) (18 - 18)  SpO2: 98% (26 Jun 2025 11:30) (93% - 98%)    Parameters below as of 26 Jun 2025 11:30  Patient On (Oxygen Delivery Method): room air          PHYSICAL EXAM:  GENERAL: NAD, well-developed  HEAD:  Atraumatic, Normocephalic  EYES: EOMI, PERRLA, conjunctiva and sclera clear  NECK: Supple, No JVD  CHEST/LUNG: Clear to auscultation bilaterally; No wheeze  HEART: S1, S2; No murmurs, rubs, or gallops  ABDOMEN: Soft, Nontender, Nondistended; Bowel sounds present  EXTREMITIES:  2+ Peripheral Pulses, No clubbing, cyanosis, or edema  PSYCH: Normal affect  NEUROLOGY: AAOX3; +dysarthria   SKIN: No rashes or lesions    Consultant(s) Notes Reviewed:  [x ] YES  [ ] NO  Care Discussed with Consultants/Other Providers [ x] YES  [ ] NO    MEDS:   MEDICATIONS  (STANDING):  apixaban 2.5 milliGRAM(s) Oral every 12 hours  cefTRIAXone   IVPB 1000 milliGRAM(s) IV Intermittent every 24 hours  losartan 100 milliGRAM(s) Oral daily  metoprolol succinate  milliGRAM(s) Oral daily  mirtazapine 15 milliGRAM(s) Oral at bedtime  rosuvastatin 20 milliGRAM(s) Oral at bedtime    MEDICATIONS  (PRN):        ALLERGIES:  shellfish (Unknown)  No Known Drug Allergies      LABS:                                            12.7   7.04  )-----------( 194      ( 25 Jun 2025 05:47 )             39.7   06-25    142  |  106  |  11  ----------------------------<  96  3.5   |  24  |  0.81    Ca    8.9      25 Jun 2025 05:47      < from: MR Head No Cont (06.25.25 @ 20:50) >    IMPRESSION: Acute left MCA territory infarctions    --- End of Report ---        < end of copied text >           < from: CT Angio Neck Stroke Protocol w/ IV Cont (06.24.25 @ 10:18) >  IMPRESSION:    CT PERFUSION: No perfusion deficit.    CTA NECK AND HEAD: No large vessel occlusion or major stenosis.    --- End of Report ---      < end of copied text >

## 2025-06-26 NOTE — OCCUPATIONAL THERAPY INITIAL EVALUATION ADULT - PERTINENT HX OF CURRENT PROBLEM, REHAB EVAL
Pt is 89F admitted 6/24/25 PMHx HTN, HLD, prior stroke in 2015 with residual left eye vision loss (bilateral occipital lobes and right posteromedial temporal lobe, right thalamic lacunar, right basal ganglia, right paramedian midbrain stroke), Afib (on Eliquis), cognitive decline (follows with Dr. Argueta), BIBEMS due to inability to speak or ambulate. LKW at around 3 AM on 6/24/2025. Daughter at bedside reports that she noted that patient was only making grunting sounds in response to conversation and appeared to have difficulty recognizing her daughter when she saw patient on 6/24/2025 at around 9 AM. However, daughter reports that patient has now returned to baseline. Of note, daughter stated that patient was reporting abdominal pain on 6/23/2025. Ambulates using a cane at baseline, requires assistance with eating and bathing. Reports adherence to Eliquis currently but reportedly had not been taking Eliquis for about 1 month for unknown reasons. Translation from Cantonese provided by daughter at bedside per patient and family preference.   MR HEAD 6/25: Acute left MCA territory infarctions

## 2025-06-26 NOTE — OCCUPATIONAL THERAPY INITIAL EVALUATION ADULT - DIAGNOSIS, OT EVAL
Pt p/w Pt p/w baseline questionable cognition, decreased strength, balance, coordination, endurance, sensation, impacting ADL

## 2025-06-26 NOTE — PATIENT PROFILE ADULT - FALL HARM RISK - RISK INTERVENTIONS
Assistance OOB with selected safe patient handling equipment/Assistance with ambulation/Communicate Fall Risk and Risk Factors to all staff, patient, and family/Monitor for mental status changes/Reinforce activity limits and safety measures with patient and family/Visual Cue: Yellow wristband/Bed in lowest position, wheels locked, appropriate side rails in place/Call bell, personal items and telephone in reach/Instruct patient to call for assistance before getting out of bed or chair/Non-slip footwear when patient is out of bed/[place] to call system/Physically safe environment - no spills, clutter or unnecessary equipment/Purposeful Proactive Rounding/Room/bathroom lighting operational, light cord in reach

## 2025-06-26 NOTE — PROGRESS NOTE ADULT - ASSESSMENT
89 female h/o cva, htn, chol, afib on eliquis, here with r/o cva     cva  neuro consult f/u  CT noted   MRI noted  pending tte  serial neuro exams  eliquis/statin    uti  ceftriax  f/u cult and sens    afib  AC with eliquis  rate control     cont other home meds      PT    I reviewed the course of events on the unit, re-confirming the patient history.   I discussed the care with the patient and their family. The plan of care was discussed with the ACP team.  Advanced care planning was discussed with patient and family.  Advanced care planning forms were reviewed and discussed as appropriate.  Differential diagnosis and plan of care discussed with patient after the evaluation.   Pain assessed and judicious use of narcotics when appropriate was discussed.  Importance of Fall prevention discussed.  Counseling on Smoking and Alcohol cessation was offered when appropriate.  Counseling on Diet, exercise, and medication compliance was done.

## 2025-06-26 NOTE — PHYSICAL THERAPY INITIAL EVALUATION ADULT - GENERAL OBSERVATIONS, REHAB EVAL
received semisupine in bed, A&OX1-2, following simple commands, Cantonese speaking with daughter at bedside translating t/o, h/o CVA with left vision loss, a/w slurred speech, right weakness, found to have acute left MCA.

## 2025-06-26 NOTE — PROGRESS NOTE ADULT - ATTENDING COMMENTS
I saw and examined the patient, reviewed diagnostic studies, and reviewed images personally. I agree with NP/PA’s history, exam, orders placed, and plan of care. Total care time spent, 50 minutes. Medical issues needing to be addressed include: cerebral infarction, HTN, HLD, hx of stroke, Afib (on Eliquis but has been noncompliant for the past month, resumed 2 days PTA), UTI. Resume Eliquis.

## 2025-06-26 NOTE — PROGRESS NOTE ADULT - ASSESSMENT
89 y/o F w/ PMH of HTN, HLD, ischemic stroke in 2015 involving the b/l occipital lobes, right posteromedial temporal lobe, and right subcortical structures c/b left vision loss, afib on Eliquis, and cognitive decline who was found this AM by her daughter to have no verbal output and confusion. This AM, the patient was complaining of abdominal pain and today the patient's daughter saw her at 9AM and noticed that the patient seemed confused and would only grunt when spoken to. EMS was called and stroke code activated. Stroke workup including CTA and CTP unremarkable and the patient returned to baseline. The patient was found to have a UTI while in the ED and started on IV Abx. While she was getting the infusion she started slurring her speech again so Neurology was called for further evaluation. 6/25 - Patient seen and examined by neurology today, with daughter present at bedside providing Cantonese translation. Patient found to have UTI at time of admission, now being treated w/ Ceftriaxone. Daughter reports improvement in pt mental status with ongoing treatment of UTI. Dysarthria resolved, confusion improving. Of note, pt continues to report R hand weakness. On exam, entire R side notably weaker than L, RUE>RLE, + RUE drift. Pending MRI brain to r/o new infarct.    IMPRESSION: Transient episode of disorientation and altered mental status now improving with treatment of UTI. Episode also followed by right wrist drop. On reexamination 6/25, entire R side noted to be weaker than L, + RUE drift. Etiology likely toxic-metabolic encephalopathy with superimposed acute embolic stroke to the hand knob.    RECOMMENDATIONS  - Continue Eliquis therapy and home rosuvastatin 20mg ( titrate to LDL <70)  - Hba1c, lipid profile  - TTE w/o bubble ( inpatient vs outpatient)  - PT/OT  - VTE prophylaxis  - BP normotensive as >48 hours from symptom onset  - neuro checks q 4 hours    case seen with Dr. Bowden 88 y/o F w/ PMH of HTN, HLD, ischemic stroke in 2015 involving the b/l occipital lobes, right posteromedial temporal lobe, and right subcortical structures c/b left vision loss, afib on Eliquis, and cognitive decline who was found this AM by her daughter to have no verbal output and confusion. This AM, the patient was complaining of abdominal pain and today the patient's daughter saw her at 9AM and noticed that the patient seemed confused and would only grunt when spoken to. EMS was called and stroke code activated. Stroke workup including CTA and CTP unremarkable and the patient returned to baseline. The patient was found to have a UTI while in the ED and started on IV Abx. While she was getting the infusion she started slurring her speech again so Neurology was called for further evaluation. 6/25 - Patient seen and examined by neurology today, with daughter present at bedside providing Cantonese translation. Patient found to have UTI at time of admission, now being treated w/ Ceftriaxone. Daughter reports improvement in pt mental status with ongoing treatment of UTI. Dysarthria resolved, confusion improving. Of note, pt continues to report R hand weakness. On exam, entire R side notably weaker than L, RUE>RLE, + RUE drift. Pending MRI brain to r/o new infarct.    IMPRESSION: Transient episode of disorientation and altered mental status now improving with treatment of UTI. Episode also followed by right wrist drop. On reexamination 6/25, entire R side noted to be weaker than L, + RUE drift. Etiology likely infectious/toxic-metabolic encephalopathy with superimposed acute embolic stroke to the hand knob, pt missed Eliquis for ~1month, and had resumed 2 days prior to presentation.    RECOMMENDATIONS  - Continue Eliquis therapy and home rosuvastatin 20mg ( titrate to LDL <70)  - Hba1c, lipid profile  - TTE w/o bubble ( inpatient vs outpatient)  - PT/OT  - VTE prophylaxis  - BP normotensive as >48 hours from symptom onset  - neuro checks q 4 hours    case seen with Dr. Bowden

## 2025-06-26 NOTE — PHYSICAL THERAPY INITIAL EVALUATION ADULT - PHYSICAL ASSIST/NONPHYSICAL ASSIST: SUPINE/SIT, REHAB EVAL
Quality 226: Preventive Care And Screening: Tobacco Use: Screening And Cessation Intervention: Patient screened for tobacco use and is an ex/non-smoker Quality 130: Documentation Of Current Medications In The Medical Record: Current Medications Documented Detail Level: Detailed verbal cues/nonverbal cues (demo/gestures)/2 person assist

## 2025-06-26 NOTE — OCCUPATIONAL THERAPY INITIAL EVALUATION ADULT - ADDITIONAL COMMENTS
Pt lives with  in first floor apt, no steps to enter. PTA pt required assist with some ADL (bathing, LB dressing), ambulated with cane. Pt owns walkin shower with shower chair.

## 2025-06-26 NOTE — PROGRESS NOTE ADULT - SUBJECTIVE AND OBJECTIVE BOX
*************************************  NEUROLOGY PROGRESS NOTE  **************************************    KYLIE BENJAMIN  Female  MRN-[id number]    Subjective: Patient seen and examined at bedside with Neurology team and attending     Interval History:    patient seen and has been noted to have right sided weakness.     VITAL SIGNS:  Vital Signs Last 24 Hrs  T(C): 36.4 (26 Jun 2025 11:30), Max: 36.8 (25 Jun 2025 20:27)  T(F): 97.5 (26 Jun 2025 11:30), Max: 98.2 (25 Jun 2025 20:27)  HR: 80 (26 Jun 2025 11:30) (55 - 84)  BP: 115/70 (26 Jun 2025 11:30) (92/58 - 115/75)  BP(mean): --  RR: 18 (26 Jun 2025 11:30) (18 - 18)  SpO2: 98% (26 Jun 2025 11:30) (93% - 98%)    Parameters below as of 26 Jun 2025 11:30  Patient On (Oxygen Delivery Method): room air    PHYSICAL EXAMINATION:      Focused neurologic exam:  MS - AAO x2, speech fluent, rep/naming intact, follows commands  CN - PERRL, visual fields intact in right eye, unable to test if visual fields are intact in left eye as patient is unable to keep her eye open for testing, EOMI, face sensation (V1-V3) intact b/l, facial strength intact without asymmetry b/l, hearing intact b/l, palate with symmetric elevation, trapezius 5/5 strength b/l, tongue midline on protrusion with full lateral movement  Motor - Normal bulk/tone, strength 5/5 entire L side, RUE 3/5, RLE 4/5, RUE drift, pronator drift. R wrist drop  Sens - LT intact all  DTR's - Neutral b/l plantar response  Coord - Coordination grossly intact for level of strength, no tremors appreciated  Gait and station - Due to fall risk/safety concerns did not assess    LABS:    CBC                       12.7   7.04  )-----------( 194      ( 25 Jun 2025 05:47 )             39.7     Chem 06-25    142  |  106  |  11  ----------------------------<  96  3.5   |  24  |  0.81    Ca    8.9      25 Jun 2025 05:47      LFTs   Coagulopathy   Lipid Panel   A1c   Cardiac enzymes     U/A Urinalysis Basic - ( 25 Jun 2025 05:47 )    Color: x / Appearance: x / SG: x / pH: x  Gluc: 96 mg/dL / Ketone: x  / Bili: x / Urobili: x   Blood: x / Protein: x / Nitrite: x   Leuk Esterase: x / RBC: x / WBC x   Sq Epi: x / Non Sq Epi: x / Bacteria: x      CSF  Immunological  Other      RADIOLOGY & ADDITIONAL STUDIES:      IMPRESSION:    CT PERFUSION: No perfusion deficit.    CTA NECK AND HEAD: No large vessel occlusion or major stenosis.    --- End of Report ---    < end of copied text >    < from: CT Brain Stroke Protocol (06.24.25 @ 10:16) >    FINDINGS: There is no acute intracranial hemorrhage, mass effect, shift   of the midline structures, herniation, extra-axial fluid collection, or   hydrocephalus.    Chronic infarcts are seen within the bilateral occipital lobes and right   posteromedial temporal lobe. There is also a chronic lacunar infarct   within the right thalamus and also within the right basal ganglia. A   chronic infarct within the right paramedian midbrain is seen.    There is mild diffuse cerebral volume loss with prominence of the sulci,   fissures, and cisternal spaces which is normal for the patient's age.   There is mild deep and periventricular white matter hypoattenuation   statistically compatible with microvascular changes given calcific   atherosclerotic disease of the intracranial arteries.    Minor scattered mucosal thickening is seen throughout the paranasal   sinuses. The tympanomastoid cavities are clear. The calvarium is intact.   There is evidence of bilateral cataract removal.    IMPRESSION: No acute intracranial hemorrhage, mass effect, or shift of   the midline structures.    Multiple additional unchanged chronic intracranial findings, as discussed.    --- End of Report ---    < end of copied text >    IMPRESSION: Acute left MCA territory infarctions    IMPRESSION: R. hemiparesis likely due to L. MCA area infarct. Mechanism pending further workup.

## 2025-06-26 NOTE — PHYSICAL THERAPY INITIAL EVALUATION ADULT - ADDITIONAL COMMENTS
Pt resides in apartment with spouse; 0 steps to enter, one level within, PTA ambulatory with cane, owns shower chair, spouse & daughter comes over to assist as needed.

## 2025-06-26 NOTE — CHART NOTE - NSCHARTNOTEFT_GEN_A_CORE
Followed on MR head results, which revealed: Acute left MCA territory infarctions. Neurology, Dr. Magallanes was notified, recommended to postpone Eliquis to 9 AM. Neuro checks q 4 hrs. Will follow closely.    Cheyenne Voss NP, #[id number]

## 2025-06-26 NOTE — PHYSICAL THERAPY INITIAL EVALUATION ADULT - PHYSICAL ASSIST/NONPHYSICAL ASSIST: SIT/STAND, REHAB EVAL
verbal cues/nonverbal cues (demo/gestures)/2 person assist no suicidal ideation/no depression/no anxiety

## 2025-06-26 NOTE — OCCUPATIONAL THERAPY INITIAL EVALUATION ADULT - BATHING, PREVIOUS LEVEL OF FUNCTION, OT EVAL
HPI:  Elke Blackburn is a 51 year old  who presents for annual exam.     Doing well. Thinking she may be menopausal or perimenopausal due to some weight changes, insomnia. Occasional hot flashes but those have actually lessened.     Had some left sided pain around Christmastime for maybe a week. This has resolved.     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  Son 16 and daughter 15    Problem List:  Patient Active Problem List   Diagnosis    Migraine without aura, without mention of intractable migraine without mention of status migrainosus    Post traumatic arthropathy PIP joint    Gestational Diabetes    IgG monoclonal protein disorder    PVC's (premature ventricular contractions)    Dizziness    Encounter for insertion of Mirena IUD    Weight gain    Hives    Smoldering multiple myeloma (SMM)    Chronic right shoulder pain    Frequent headaches    Rheumatoid arthritis involving both hands with positive rheumatoid factor  (CMD)    Gastroesophageal reflux disease    Seropositive rheumatoid arthritis (CMD)    Bilateral foot pain       Past Medical History:    Past Medical History:   Diagnosis Date    Cancer  (CMD) 2011    [facility] Smoldering Myloma    Cataract     Dysplasia of cervix (uteri) 2004    Dysplasia -  HGSIL   @ [facility]    fractu     fractures of L hand and L foot    HSV (herpes simplex virus) anogenital infection 2008    Irritable bowel syndrome     Irritable Bowel Syndrome    MGUS (monoclonal gammopathy of unknown significance) 2013    Migraine, unspecified, without mention of intractable migraine without mention of status migrainosus     Migraine- controlled on Midrin; associated with dizzyness and sometimes nausea.     Other and unspecified ovarian cyst        Past Surgical History:   Past Surgical History:    Procedure Laterality Date    Arthroscopy shoulder decompress subacromial space w part acromioplasty  2003    Rt. shoulder arthroscopy, subacromial bursectomy, shaving of superior surface tear of the rotator cuff.     Bone marrow biopsy w/ aspiration  2021     delivery+postpartum care  2010    , Low transverse- placenta previa    Colonoscopy  2023    dr. welch, recall 10 years normal colonoscopy    Colposcopy bx cervix endocerv curr  2004    Colposcopy    Conization cervix,loop electrd  2004    LEEP    Conization cervix,loop electrd  2004    LEEP of ectocervix    Femur/knee surg unlisted   approx.    Unspecified femur/knee procedure: reconstruction of ACL (after skiing incident)    Hand finger surgery unlisted  2006     L 5th finger PIP jt. arthroplasty    Intra contr dev, mirena  2010, 11/23/15, 21    Oral surgery procedure   approx.    [place] Teeth Extraction    Repair of nasal septum      Septoplasty for deviated septum    Shoulder surg proc unlisted  2010    Cubital tunnel release, L elbow ulnar nerve. Subacromial decompression, L shoulder. Distal clavicle excision, L shoulder. Extensive debridement of glenohumeral joint with chondroplasty, L shoulder.     Thumb carpometacarpal athroscopy w jt debrid & cap plica Left 2019    Left thumb basilar joint arthritis; Satya Souza, [facility]    Thumb carpometacarpal athroscopy w jt debrid & cap plica Right        Family History:   Family History   Problem Relation Age of Onset    Hypertension Mother     Cancer, Thyroid Mother         thyroid    Hypertension Father     Rheumatoid Arthritis Father     Rheumatoid Arthritis Sister     ADHD/ADD Daughter     Anxiety disorder Daughter     Migraine Son         ?    Cancer, Lung Paternal Grandmother     Emphysema Paternal Grandfather     Cancer, Breast Paternal Aunt 45    Cancer Paternal Aunt         breast       Social  History:   Social History     Tobacco Use    Smoking status: Never     Passive exposure: Never    Smokeless tobacco: Never   Vaping Use    Vaping status: never used   Substance Use Topics    Alcohol use: Yes     Alcohol/week: 2.0 - 3.0 standard drinks of alcohol     Types: 2 - 3 Standard drinks or equivalent per week    Drug use: No       Obstetric History:   OB History    Para Term  AB Living   2 2 1 1 0 1   SAB IAB Ectopic Molar Multiple Live Births   0 0 0   0 2      # Outcome Date GA Lbr Henrry/2nd Weight Sex Type Anes PTL Lv   2  07/03/10 31w5d  2070 g (4 lb 9 oz) F CS-LTranv Spinal  SEBASTIAN      Birth Comments: Emergent c/s for complete previa due to bleeding/contractions   1 Term 09 39w3d  3459 g (7 lb 10 oz) M Vag-Spont EPI           Allergies:  Allergies as of 2025 - Reviewed 2025   Allergen Reaction Noted    Minocycline HIVES 2013       Medications:  Current Outpatient Medications   Medication Sig Dispense Refill    methotrexate (RHEUMATREX) 2.5 MG tablet take 10 tablets by mouth one day a week 40 tablet 0    Probiotic Product (PROBIOTIC-10 PO) Take by mouth daily.      meloxicam (MOBIC) 15 MG tablet Take 1 tablet by mouth daily as needed for Pain. Don't take naproxen or ibuprofen while on this medication 90 tablet 3    phentermine (ADIPEX-P) 37.5 MG tablet Take 1 tablet by mouth daily (before breakfast). 30 tablet 5    ondansetron (Zofran ODT) 4 MG disintegrating tablet Place 1 tablet onto the tongue every 8 hours as needed for Nausea. 12 tablet 0    folic acid (FOLATE) 1 MG tablet Take 1 tablet by mouth daily. 90 tablet 3    omeprazole (PriLOSEC) 40 MG capsule Take 1 capsule by mouth daily. Take daily 30 minutes before a meal. 90 capsule 3    Vitamin D, Cholecalciferol, 50 mcg (2,000 units) capsule Take 1 capsule by mouth daily. 30 capsule 11    levonorgestrel (MIRENA) (52 MG) 20 MCG/DAY intrauterine device 1 each by Intrauterine route.      Multiple  Vitamins-Minerals (MULTI-VITAMIN GUMMIES) CHEW Chew 1 capsule by mouth daily.      calcium carbonate (TUMS) 500 MG chewable tablet Chew 1 tablet by mouth in the morning and 1 tablet in the evening.       No current facility-administered medications for this visit.       Review of Systems:  Negative per HPI      Physical Exam:  Vitals:    25 0814   BP: 120/64   Weight: 68.7 kg (151 lb 6.4 oz)   Height: 5' 1.5\" (1.562 m)     Body mass index is 28.14 kg/m².    Gen: NAD  CV: RR   Lungs: Normal resp effort   Abdomen:  soft. nontender to palpation   Breast: Symmetric appearance without lesions or rashes. No tenderness with palpation.  No masses or lumps. No spontaneous or elicited nipple discharge. No axillary lymphadenopathy.   SSE: normal external genitalia without rashes or lesions. Vaginal mucosa appropriately pink and moist. Scant vaginal discharge, white in color in vaginal vault. Cervix visually closed with Mirena IUD strings visualized.  Bimanual: Uterus appropriately sized in anteverted position. No CMT. No adnexal tenderness or fullness.   Extremities: NT/NE      Impression/Plan:  Elke Blackburn is a 51 year old  who presents for annual visit. Overall doing well.     Health Maintenance:   - Pap screening collected. Remote hx of LEEP. If normal can wait 5 years  - Mirena IUD in since . Due out   - mammo UTD    Discussed perimenopause/menopause and what to expect    RTC in 1 year or sooner SHANIA He MD     needed assist

## 2025-06-26 NOTE — CHART NOTE - NSCHARTNOTEFT_GEN_A_CORE
Informed by primary team that MRI brain performed 6/25 and results as follows. Patient was NIHSS 1 at arrival, not a TNK candidate as out of window, not a thrombectomy candidate as no LVO      MR Head No Cont (06.25.25 @ 20:50)       FINDINGS:  There are small areas of abnormal cortical restricted diffusion in the   left frontal lobe on images 50 through 54 of series 5 compatible with   acute infarctions. Chronic bilateral occipital lobe infarctions with   hemosiderin staining. Chronic small right basal ganglia lacunar infarct.   Scattered periventricular and subcortical white matter T2 /FLAIR   hyperintensities are seen without mass effect, nonspecific, likely   representing moderate chronic microvascular changes. . Normal T2   flow-voids are seen within  the intracranial vasculature. The lateral   ventricles and cortical sulci are age-appropriatein size and   configuration. There is no mass, mass effect, or extra-axial fluid   collection. There is no susceptibility artifact to suggest hemorrhage.   Midline structures are normal. The patient is status post bilateral   ocular lens replacement surgery. The visualized paranasal sinuses,   mastoid air cells and orbits are otherwise unremarkable.      IMPRESSION: Acute left MCA territory infarctions    IMPRESSION: R. hemiparesis likely due to L. MCA area infarct. Mechanism pending further workup.     RECOMMENDATIONS  - Continue home rosuvastatin 20mg ( titrate to LDL <70)  - Hba1c, lipid profile  - TTE w/o bubble  - PT/OT  - VTE prophylaxis  - BP normotensive as >48 hours from symptom onset    To be discussed with stroke fellow Informed by primary team that MRI brain performed 6/25 and results as follows. Patient was NIHSS 1 at arrival, not a TNK candidate as out of window, not a thrombectomy candidate as no LVO      MR Head No Cont (06.25.25 @ 20:50)       FINDINGS:  There are small areas of abnormal cortical restricted diffusion in the   left frontal lobe on images 50 through 54 of series 5 compatible with   acute infarctions. Chronic bilateral occipital lobe infarctions with   hemosiderin staining. Chronic small right basal ganglia lacunar infarct.   Scattered periventricular and subcortical white matter T2 /FLAIR   hyperintensities are seen without mass effect, nonspecific, likely   representing moderate chronic microvascular changes. . Normal T2   flow-voids are seen within  the intracranial vasculature. The lateral   ventricles and cortical sulci are age-appropriatein size and   configuration. There is no mass, mass effect, or extra-axial fluid   collection. There is no susceptibility artifact to suggest hemorrhage.   Midline structures are normal. The patient is status post bilateral   ocular lens replacement surgery. The visualized paranasal sinuses,   mastoid air cells and orbits are otherwise unremarkable.      IMPRESSION: Acute left MCA territory infarctions    IMPRESSION: R. hemiparesis likely due to L. MCA area infarct. Mechanism pending further workup.     RECOMMENDATIONS  - Continue Eliquis therapy and home rosuvastatin 20mg ( titrate to LDL <70)  - Hba1c, lipid profile  - TTE w/o bubble ( inpatient vs outpatient)  - PT/OT  - VTE prophylaxis  - BP normotensive as >48 hours from symptom onset    Discussed with stroke fellow, patient to be seen by attending and team in AM

## 2025-06-26 NOTE — OCCUPATIONAL THERAPY INITIAL EVALUATION ADULT - GENERAL OBSERVATIONS, REHAB EVAL
Pt rec'd Pt rec'd semi supine in bed, +IVL, VSS, NAD; daughter at bedside to translate 2* pt Cantonese speaking

## 2025-06-26 NOTE — PHYSICAL THERAPY INITIAL EVALUATION ADULT - GAIT DEVIATIONS NOTED, PT EVAL
decreased ludmila/increased time in double stance/decreased velocity of limb motion/decreased step length/decreased stride length

## 2025-06-27 LAB
A1C WITH ESTIMATED AVERAGE GLUCOSE RESULT: 6.1 % — HIGH (ref 4–5.6)
CHOLEST SERPL-MCNC: 119 MG/DL — SIGNIFICANT CHANGE UP
ESTIMATED AVERAGE GLUCOSE: 128 MG/DL — HIGH (ref 68–114)
HDLC SERPL-MCNC: 54 MG/DL — SIGNIFICANT CHANGE UP
LDLC SERPL-MCNC: 51 MG/DL — SIGNIFICANT CHANGE UP
LIPID PNL WITH DIRECT LDL SERPL: 51 MG/DL — SIGNIFICANT CHANGE UP
NONHDLC SERPL-MCNC: 66 MG/DL — SIGNIFICANT CHANGE UP
TRIGL SERPL-MCNC: 72 MG/DL — SIGNIFICANT CHANGE UP

## 2025-06-27 PROCEDURE — 93970 EXTREMITY STUDY: CPT | Mod: 26

## 2025-06-27 RX ORDER — GABAPENTIN 400 MG/1
100 CAPSULE ORAL THREE TIMES A DAY
Refills: 0 | Status: DISCONTINUED | OUTPATIENT
Start: 2025-06-27 | End: 2025-07-04

## 2025-06-27 RX ADMIN — METOPROLOL SUCCINATE 100 MILLIGRAM(S): 50 TABLET, EXTENDED RELEASE ORAL at 06:06

## 2025-06-27 RX ADMIN — CEFTRIAXONE 100 MILLIGRAM(S): 500 INJECTION, POWDER, FOR SOLUTION INTRAMUSCULAR; INTRAVENOUS at 11:58

## 2025-06-27 RX ADMIN — MIRTAZAPINE 15 MILLIGRAM(S): 30 TABLET, FILM COATED ORAL at 20:52

## 2025-06-27 RX ADMIN — APIXABAN 2.5 MILLIGRAM(S): 2.5 TABLET, FILM COATED ORAL at 08:29

## 2025-06-27 RX ADMIN — APIXABAN 2.5 MILLIGRAM(S): 2.5 TABLET, FILM COATED ORAL at 20:52

## 2025-06-27 RX ADMIN — ROSUVASTATIN CALCIUM 20 MILLIGRAM(S): 5 TABLET, FILM COATED ORAL at 20:52

## 2025-06-27 RX ADMIN — LOSARTAN POTASSIUM 100 MILLIGRAM(S): 100 TABLET, FILM COATED ORAL at 11:58

## 2025-06-27 NOTE — CHART NOTE - NSCHARTNOTEFT_GEN_A_CORE
Spoke with patient's daughter, Sinai ([phone]), and answered all questions regarding Ms. Cabezas's stroke management and prognosis. TTE reviewed and WNL. Patient continues to experience RLE pain since yesterday; please obtain RLE doppler to r/o DVT.    Recommendations:  - Continue Eliquis and home Rosuvastatin 20mg (titrate to LDL<70)  - RLE doppler for RLE pain since yesterday  - A1c (6.1), lipid profile (LDL 51)  - PT/OT  - BP normotensive (>48hrs from symptom onset)    Neurology to sign off - contact us as needed [phone]. Thank you.     Case discussed with Dr. Bowden, Stroke Attending.

## 2025-06-27 NOTE — PROGRESS NOTE ADULT - SUBJECTIVE AND OBJECTIVE BOX
KYLIE BENJAMIN  89y Female  MRN:[id number]    Patient is a 89y old  Female who presents with a chief complaint of slurred speech    HPI:   88 yo female, with PMHx of HTN, HLD, prior stroke in 2015 with residual left eye vision loss (bilateral occipital lobes and right posteromedial temporal lobe, right thalamic lacunar, right basal ganglia, right paramedian midbrain stroke), Afib (on Eliquis), cognitive decline (follows with Dr. Argueta), BIBEMS due to inability to speak or ambulate. LKW at around 3 AM on 6/24/2025. Daughter at bedside reports that she noted that patient was only making grunting sounds in response to conversation and appeared to have difficulty recognizing her daughter when she saw patient on 6/24/2025 at around 9 AM. However, daughter reports that patient has now returned to baseline. Of note, daughter stated that patient was reporting abdominal pain on 6/23/2025. Ambulates using a cane at baseline, requires assistance with eating and bathing. Reports adherence to Eliquis currently but reportedly had not been taking Eliquis for about 1 month for unknown reasons. Translation from Cantonese provided by daughter at bedside per patient and family preference.  (24 Jun 2025 18:58)      Patient seen and evaluated at bedside. No acute events overnight except as noted.    Interval HPI: no acute events o/n     PAST MEDICAL & SURGICAL HISTORY:  Hypertension      Hyperlipidemia      CVA (cerebral vascular accident)      Afib      No significant past surgical history          REVIEW OF SYSTEMS:  as per hpi       VITALS:   Vital Signs Last 24 Hrs  T(C): 36.5 (27 Jun 2025 12:03), Max: 37.3 (26 Jun 2025 20:09)  T(F): 97.7 (27 Jun 2025 12:03), Max: 99.2 (26 Jun 2025 20:09)  HR: 65 (27 Jun 2025 12:03) (62 - 80)  BP: 112/74 (27 Jun 2025 12:03) (111/69 - 126/67)  BP(mean): --  RR: 18 (27 Jun 2025 12:03) (18 - 18)  SpO2: 95% (27 Jun 2025 12:03) (94% - 98%)    Parameters below as of 27 Jun 2025 12:03  Patient On (Oxygen Delivery Method): room air          PHYSICAL EXAM:  GENERAL: NAD, well-developed  HEAD:  Atraumatic, Normocephalic  EYES: EOMI, PERRLA, conjunctiva and sclera clear  NECK: Supple, No JVD  CHEST/LUNG: Clear to auscultation bilaterally; No wheeze  HEART: S1, S2; No murmurs, rubs, or gallops  ABDOMEN: Soft, Nontender, Nondistended; Bowel sounds present  EXTREMITIES:  2+ Peripheral Pulses, No clubbing, cyanosis, or edema  PSYCH: Normal affect  NEUROLOGY: AAOX3; +dysarthria   SKIN: No rashes or lesions    Consultant(s) Notes Reviewed:  [x ] YES  [ ] NO  Care Discussed with Consultants/Other Providers [ x] YES  [ ] NO    MEDS:   MEDICATIONS  (STANDING):  apixaban 2.5 milliGRAM(s) Oral every 12 hours  cefTRIAXone   IVPB 1000 milliGRAM(s) IV Intermittent every 24 hours  losartan 100 milliGRAM(s) Oral daily  metoprolol succinate  milliGRAM(s) Oral daily  mirtazapine 15 milliGRAM(s) Oral at bedtime  rosuvastatin 20 milliGRAM(s) Oral at bedtime    MEDICATIONS  (PRN):        ALLERGIES:  shellfish (Unknown)  No Known Drug Allergies      LABS:                                            < from: TTE Limited W or WO Ultrasound Enhancing Agent (06.26.25 @ 14:40) >  _______________________________________________________________________________________     CONCLUSIONS:      1. Left ventricular cavity is small. Left ventricular wall thickness is normal. Left ventricular systolic function is normal with an ejection fraction of 57 % by Denson's method of disks. There are no regional wall motion abnormalities seen.   2. Normal right ventricular cavity size, with normal wall thickness, and normal right ventricular systolic function. Tricuspid annular plane systolic excursion (TAPSE) is 2.0 cm (normal >=1.7 cm).   3. Left atrium is mildly dilated.   4. The right atrium is mildly dilated.   5. No pericardial effusion seen.   6. No prior echocardiogram is available for comparison.   7. Agitated saline injection was negative for intracardiac shunt.   8. Severe tricuspid regurgitation.   9. There is normal LV mass and concentric remodeling.    ________________________________________________________________________________________    < end of copied text >      < from: MR Head No Cont (06.25.25 @ 20:50) >    IMPRESSION: Acute left MCA territory infarctions    --- End of Report ---        < end of copied text >           < from: CT Angio Neck Stroke Protocol w/ IV Cont (06.24.25 @ 10:18) >  IMPRESSION:    CT PERFUSION: No perfusion deficit.    CTA NECK AND HEAD: No large vessel occlusion or major stenosis.    --- End of Report ---      < end of copied text >

## 2025-06-27 NOTE — PROGRESS NOTE ADULT - ASSESSMENT
89 female h/o cva, htn, chol, afib on eliquis, here with r/o cva     cva  neuro consult f/u  CT noted   MRI noted   tte noted  serial neuro exams  eliquis/statin  neuro f/u noted  PT/OT     uti  ceftriax  f/u cult and sens    afib  AC with eliquis  rate control    lower ext pain  f/u dopplers r/o dvt     cont other home meds      PT    d/w daughter bedside     I reviewed the course of events on the unit, re-confirming the patient history.   I discussed the care with the patient and their family. The plan of care was discussed with the ACP team.  Advanced care planning was discussed with patient and family.  Advanced care planning forms were reviewed and discussed as appropriate.  Differential diagnosis and plan of care discussed with patient after the evaluation.   Pain assessed and judicious use of narcotics when appropriate was discussed.  Importance of Fall prevention discussed.  Counseling on Smoking and Alcohol cessation was offered when appropriate.  Counseling on Diet, exercise, and medication compliance was done.

## 2025-06-28 LAB
-  AMOXICILLIN/CLAVULANIC ACID: SIGNIFICANT CHANGE UP
-  AMPICILLIN/SULBACTAM: SIGNIFICANT CHANGE UP
-  AMPICILLIN: SIGNIFICANT CHANGE UP
-  AZTREONAM: SIGNIFICANT CHANGE UP
-  CEFAZOLIN: SIGNIFICANT CHANGE UP
-  CEFEPIME: SIGNIFICANT CHANGE UP
-  CEFOXITIN: SIGNIFICANT CHANGE UP
-  CEFTRIAXONE: SIGNIFICANT CHANGE UP
-  CEFUROXIME: SIGNIFICANT CHANGE UP
-  CIPROFLOXACIN: SIGNIFICANT CHANGE UP
-  ERTAPENEM: SIGNIFICANT CHANGE UP
-  GENTAMICIN: SIGNIFICANT CHANGE UP
-  IMIPENEM: SIGNIFICANT CHANGE UP
-  LEVOFLOXACIN: SIGNIFICANT CHANGE UP
-  MEROPENEM: SIGNIFICANT CHANGE UP
-  NITROFURANTOIN: SIGNIFICANT CHANGE UP
-  PIPERACILLIN/TAZOBACTAM: SIGNIFICANT CHANGE UP
-  TIGECYCLINE: SIGNIFICANT CHANGE UP
-  TOBRAMYCIN: SIGNIFICANT CHANGE UP
-  TRIMETHOPRIM/SULFAMETHOXAZOLE: SIGNIFICANT CHANGE UP
CULTURE RESULTS: ABNORMAL
METHOD TYPE: SIGNIFICANT CHANGE UP
ORGANISM # SPEC MICROSCOPIC CNT: ABNORMAL
ORGANISM # SPEC MICROSCOPIC CNT: ABNORMAL
SPECIMEN SOURCE: SIGNIFICANT CHANGE UP

## 2025-06-28 RX ORDER — POLYETHYLENE GLYCOL 3350 17 G/17G
17 POWDER, FOR SOLUTION ORAL ONCE
Refills: 0 | Status: COMPLETED | OUTPATIENT
Start: 2025-06-28 | End: 2025-06-28

## 2025-06-28 RX ADMIN — CEFTRIAXONE 100 MILLIGRAM(S): 500 INJECTION, POWDER, FOR SOLUTION INTRAMUSCULAR; INTRAVENOUS at 12:20

## 2025-06-28 RX ADMIN — GABAPENTIN 100 MILLIGRAM(S): 400 CAPSULE ORAL at 04:53

## 2025-06-28 RX ADMIN — POLYETHYLENE GLYCOL 3350 17 GRAM(S): 17 POWDER, FOR SOLUTION ORAL at 05:59

## 2025-06-28 RX ADMIN — APIXABAN 2.5 MILLIGRAM(S): 2.5 TABLET, FILM COATED ORAL at 08:28

## 2025-06-28 RX ADMIN — ROSUVASTATIN CALCIUM 20 MILLIGRAM(S): 5 TABLET, FILM COATED ORAL at 22:10

## 2025-06-28 RX ADMIN — LOSARTAN POTASSIUM 100 MILLIGRAM(S): 100 TABLET, FILM COATED ORAL at 13:55

## 2025-06-28 RX ADMIN — METOPROLOL SUCCINATE 100 MILLIGRAM(S): 50 TABLET, EXTENDED RELEASE ORAL at 04:53

## 2025-06-28 RX ADMIN — APIXABAN 2.5 MILLIGRAM(S): 2.5 TABLET, FILM COATED ORAL at 22:09

## 2025-06-28 RX ADMIN — GABAPENTIN 100 MILLIGRAM(S): 400 CAPSULE ORAL at 22:09

## 2025-06-28 RX ADMIN — GABAPENTIN 100 MILLIGRAM(S): 400 CAPSULE ORAL at 13:57

## 2025-06-28 RX ADMIN — MIRTAZAPINE 15 MILLIGRAM(S): 30 TABLET, FILM COATED ORAL at 22:10

## 2025-06-28 NOTE — PROGRESS NOTE ADULT - ASSESSMENT
89 female h/o cva, htn, chol, afib on eliquis, here with r/o cva     cva  neuro consult f/u  CT noted   MRI noted   tte noted  serial neuro exams  eliquis/statin  neuro f/u noted  PT/OT     uti  ceftriax  f/u cult and sens    afib  AC with eliquis  rate control    lower ext pain  f/u dopplers r/o dvt - neg  trial of gabapentin      cont other home meds      PT    d/w daughter bedside     I reviewed the course of events on the unit, re-confirming the patient history.   I discussed the care with the patient and their family. The plan of care was discussed with the ACP team.  Advanced care planning was discussed with patient and family.  Advanced care planning forms were reviewed and discussed as appropriate.  Differential diagnosis and plan of care discussed with patient after the evaluation.   Pain assessed and judicious use of narcotics when appropriate was discussed.  Importance of Fall prevention discussed.  Counseling on Smoking and Alcohol cessation was offered when appropriate.  Counseling on Diet, exercise, and medication compliance was done.

## 2025-06-28 NOTE — PROGRESS NOTE ADULT - SUBJECTIVE AND OBJECTIVE BOX
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 Argueta), BIBEMS due to inability to speak or ambulate. LKW at around 3 AM on 6/24/2025. Daughter at bedside reports that she noted that patient was only making grunting sounds in response to conversation and appeared to have difficulty recognizing her daughter when she saw patient on 6/24/2025 at around 9 AM. However, daughter reports that patient has now returned to baseline. Of note, daughter stated that patient was reporting abdominal pain on 6/23/2025. Ambulates using a cane at baseline, requires assistance with eating and bathing. Reports adherence to Eliquis currently but reportedly had not been taking Eliquis for about 1 month for unknown reasons. Translation from Cantonese provided by daughter at bedside per patient and family preference.  (24 Jun 2025 18:58)      Patient seen and evaluated at bedside. No acute events overnight except as noted.    Interval HPI: no acute events o/n     PAST MEDICAL & SURGICAL HISTORY:  Hypertension      Hyperlipidemia      CVA (cerebral vascular accident)      Afib      No significant past surgical history          REVIEW OF SYSTEMS:  as per hpi       VITALS:   Vital Signs Last 24 Hrs  T(C): 37.3 (28 Jun 2025 21:10), Max: 37.3 (28 Jun 2025 21:10)  T(F): 99.2 (28 Jun 2025 21:10), Max: 99.2 (28 Jun 2025 21:10)  HR: 99 (28 Jun 2025 21:10) (64 - 99)  BP: 114/72 (28 Jun 2025 21:10) (111/78 - 136/78)  BP(mean): --  RR: 18 (28 Jun 2025 21:10) (18 - 18)  SpO2: 94% (28 Jun 2025 21:10) (93% - 95%)    Parameters below as of 28 Jun 2025 21:10  Patient On (Oxygen Delivery Method): room air          PHYSICAL EXAM:  GENERAL: NAD, well-developed  HEAD:  Atraumatic, Normocephalic  EYES: EOMI, PERRLA, conjunctiva and sclera clear  NECK: Supple, No JVD  CHEST/LUNG: Clear to auscultation bilaterally; No wheeze  HEART: S1, S2; No murmurs, rubs, or gallops  ABDOMEN: Soft, Nontender, Nondistended; Bowel sounds present  EXTREMITIES:  2+ Peripheral Pulses, No clubbing, cyanosis, or edema  PSYCH: Normal affect  NEUROLOGY: AAOX3; +dysarthria   SKIN: No rashes or lesions    Consultant(s) Notes Reviewed:  [x ] YES  [ ] NO  Care Discussed with Consultants/Other Providers [ x] YES  [ ] NO    MEDS:   MEDICATIONS  (STANDING):  apixaban 2.5 milliGRAM(s) Oral every 12 hours  cefTRIAXone   IVPB 1000 milliGRAM(s) IV Intermittent every 24 hours  gabapentin 100 milliGRAM(s) Oral three times a day  losartan 100 milliGRAM(s) Oral daily  metoprolol succinate  milliGRAM(s) Oral daily  mirtazapine 15 milliGRAM(s) Oral at bedtime  rosuvastatin 20 milliGRAM(s) Oral at bedtime    MEDICATIONS  (PRN):        ALLERGIES:  shellfish (Unknown)  No Known Drug Allergies      LABS:           < from: [facility] Duplex Lower Ext Vein Scan, Bilat (06.27.25 @ 18:09) >  IMPRESSION:  No evidence of deep venous thrombosis in either lower extremity.        < end of copied text >                                   < from: TTE Limited W or WO Ultrasound Enhancing Agent (06.26.25 @ 14:40) >  _______________________________________________________________________________________     CONCLUSIONS:      1. Left ventricular cavity is small. Left ventricular wall thickness is normal. Left ventricular systolic function is normal with an ejection fraction of 57 % by Denson's method of disks. There are no regional wall motion abnormalities seen.   2. Normal right ventricular cavity size, with normal wall thickness, and normal right ventricular systolic function. Tricuspid annular plane systolic excursion (TAPSE) is 2.0 cm (normal >=1.7 cm).   3. Left atrium is mildly dilated.   4. The right atrium is mildly dilated.   5. No pericardial effusion seen.   6. No prior echocardiogram is available for comparison.   7. Agitated saline injection was negative for intracardiac shunt.   8. Severe tricuspid regurgitation.   9. There is normal LV mass and concentric remodeling.    ________________________________________________________________________________________    < end of copied text >      < from: MR Head No Cont (06.25.25 @ 20:50) >    IMPRESSION: Acute left MCA territory infarctions    --- End of Report ---        < end of copied text >           < from: CT Angio Neck Stroke Protocol w/ IV Cont (06.24.25 @ 10:18) >  IMPRESSION:    CT PERFUSION: No perfusion deficit.    CTA NECK AND HEAD: No large vessel occlusion or major stenosis.    --- End of Report ---      < end of copied text >

## 2025-06-29 LAB
ALBUMIN SERPL ELPH-MCNC: 3.6 G/DL — SIGNIFICANT CHANGE UP (ref 3.3–5)
ALP SERPL-CCNC: 64 U/L — SIGNIFICANT CHANGE UP (ref 40–120)
ALT FLD-CCNC: 34 U/L — SIGNIFICANT CHANGE UP (ref 10–45)
ANION GAP SERPL CALC-SCNC: 14 MMOL/L — SIGNIFICANT CHANGE UP (ref 5–17)
AST SERPL-CCNC: 36 U/L — SIGNIFICANT CHANGE UP (ref 10–40)
BASOPHILS # BLD AUTO: 0.05 K/UL — SIGNIFICANT CHANGE UP (ref 0–0.2)
BASOPHILS NFR BLD AUTO: 0.4 % — SIGNIFICANT CHANGE UP (ref 0–2)
BILIRUB SERPL-MCNC: 1.2 MG/DL — SIGNIFICANT CHANGE UP (ref 0.2–1.2)
BUN SERPL-MCNC: 12 MG/DL — SIGNIFICANT CHANGE UP (ref 7–23)
CALCIUM SERPL-MCNC: 8.9 MG/DL — SIGNIFICANT CHANGE UP (ref 8.4–10.5)
CHLORIDE SERPL-SCNC: 98 MMOL/L — SIGNIFICANT CHANGE UP (ref 96–108)
CO2 SERPL-SCNC: 23 MMOL/L — SIGNIFICANT CHANGE UP (ref 22–31)
CREAT SERPL-MCNC: 0.7 MG/DL — SIGNIFICANT CHANGE UP (ref 0.5–1.3)
EGFR: 83 ML/MIN/1.73M2 — SIGNIFICANT CHANGE UP
EGFR: 83 ML/MIN/1.73M2 — SIGNIFICANT CHANGE UP
EOSINOPHIL # BLD AUTO: 0 K/UL — SIGNIFICANT CHANGE UP (ref 0–0.5)
EOSINOPHIL NFR BLD AUTO: 0 % — SIGNIFICANT CHANGE UP (ref 0–6)
FLUAV AG NPH QL: SIGNIFICANT CHANGE UP
FLUBV AG NPH QL: SIGNIFICANT CHANGE UP
GAS PNL BLDV: SIGNIFICANT CHANGE UP
GLUCOSE BLDC GLUCOMTR-MCNC: 116 MG/DL — HIGH (ref 70–99)
GLUCOSE SERPL-MCNC: 134 MG/DL — HIGH (ref 70–99)
HCT VFR BLD CALC: 40.8 % — SIGNIFICANT CHANGE UP (ref 34.5–45)
HGB BLD-MCNC: 13.4 G/DL — SIGNIFICANT CHANGE UP (ref 11.5–15.5)
IMM GRANULOCYTES # BLD AUTO: 0.05 K/UL — SIGNIFICANT CHANGE UP (ref 0–0.07)
IMM GRANULOCYTES NFR BLD AUTO: 0.4 % — SIGNIFICANT CHANGE UP (ref 0–0.9)
LACTATE SERPL-SCNC: 1.1 MMOL/L — SIGNIFICANT CHANGE UP (ref 0.5–2)
LYMPHOCYTES # BLD AUTO: 1.62 K/UL — SIGNIFICANT CHANGE UP (ref 1–3.3)
LYMPHOCYTES NFR BLD AUTO: 12.4 % — LOW (ref 13–44)
MCHC RBC-ENTMCNC: 30.5 PG — SIGNIFICANT CHANGE UP (ref 27–34)
MCHC RBC-ENTMCNC: 32.8 G/DL — SIGNIFICANT CHANGE UP (ref 32–36)
MCV RBC AUTO: 92.9 FL — SIGNIFICANT CHANGE UP (ref 80–100)
MONOCYTES # BLD AUTO: 1.39 K/UL — HIGH (ref 0–0.9)
MONOCYTES NFR BLD AUTO: 10.6 % — SIGNIFICANT CHANGE UP (ref 2–14)
NEUTROPHILS # BLD AUTO: 9.99 K/UL — HIGH (ref 1.8–7.4)
NEUTROPHILS NFR BLD AUTO: 76.2 % — SIGNIFICANT CHANGE UP (ref 43–77)
NRBC # BLD AUTO: 0 K/UL — SIGNIFICANT CHANGE UP (ref 0–0)
NRBC # FLD: 0 K/UL — SIGNIFICANT CHANGE UP (ref 0–0)
NRBC BLD AUTO-RTO: 0 /100 WBCS — SIGNIFICANT CHANGE UP (ref 0–0)
PLATELET # BLD AUTO: 216 K/UL — SIGNIFICANT CHANGE UP (ref 150–400)
PMV BLD: 11.2 FL — SIGNIFICANT CHANGE UP (ref 7–13)
POTASSIUM SERPL-MCNC: 3.9 MMOL/L — SIGNIFICANT CHANGE UP (ref 3.5–5.3)
POTASSIUM SERPL-SCNC: 3.9 MMOL/L — SIGNIFICANT CHANGE UP (ref 3.5–5.3)
PROCALCITONIN SERPL-MCNC: 0.14 NG/ML — HIGH (ref 0.02–0.1)
PROT SERPL-MCNC: 7.2 G/DL — SIGNIFICANT CHANGE UP (ref 6–8.3)
RBC # BLD: 4.39 M/UL — SIGNIFICANT CHANGE UP (ref 3.8–5.2)
RBC # FLD: 12.4 % — SIGNIFICANT CHANGE UP (ref 10.3–14.5)
RSV RNA NPH QL NAA+NON-PROBE: SIGNIFICANT CHANGE UP
SARS-COV-2 RNA SPEC QL NAA+PROBE: SIGNIFICANT CHANGE UP
SODIUM SERPL-SCNC: 135 MMOL/L — SIGNIFICANT CHANGE UP (ref 135–145)
SOURCE RESPIRATORY: SIGNIFICANT CHANGE UP
WBC # BLD: 13.1 K/UL — HIGH (ref 3.8–10.5)
WBC # FLD AUTO: 13.1 K/UL — HIGH (ref 3.8–10.5)

## 2025-06-29 PROCEDURE — G0545: CPT

## 2025-06-29 PROCEDURE — 74177 CT ABD & PELVIS W/CONTRAST: CPT | Mod: 26

## 2025-06-29 PROCEDURE — 99222 1ST HOSP IP/OBS MODERATE 55: CPT | Mod: GC

## 2025-06-29 PROCEDURE — 71045 X-RAY EXAM CHEST 1 VIEW: CPT | Mod: 26

## 2025-06-29 PROCEDURE — 70450 CT HEAD/BRAIN W/O DYE: CPT | Mod: 26

## 2025-06-29 PROCEDURE — 71260 CT THORAX DX C+: CPT | Mod: 26

## 2025-06-29 RX ORDER — ACETAMINOPHEN 500 MG/5ML
650 LIQUID (ML) ORAL ONCE
Refills: 0 | Status: DISCONTINUED | OUTPATIENT
Start: 2025-06-29 | End: 2025-07-04

## 2025-06-29 RX ORDER — VANCOMYCIN HCL IN 5 % DEXTROSE 1.5G/250ML
1000 PLASTIC BAG, INJECTION (ML) INTRAVENOUS EVERY 12 HOURS
Refills: 0 | Status: DISCONTINUED | OUTPATIENT
Start: 2025-06-29 | End: 2025-06-29

## 2025-06-29 RX ORDER — VANCOMYCIN HCL IN 5 % DEXTROSE 1.5G/250ML
750 PLASTIC BAG, INJECTION (ML) INTRAVENOUS EVERY 12 HOURS
Refills: 0 | Status: DISCONTINUED | OUTPATIENT
Start: 2025-06-29 | End: 2025-07-01

## 2025-06-29 RX ORDER — ACETAMINOPHEN 500 MG/5ML
725 LIQUID (ML) ORAL ONCE
Refills: 0 | Status: COMPLETED | OUTPATIENT
Start: 2025-06-29 | End: 2025-06-30

## 2025-06-29 RX ORDER — SODIUM CHLORIDE 9 G/1000ML
1500 INJECTION, SOLUTION INTRAVENOUS ONCE
Refills: 0 | Status: COMPLETED | OUTPATIENT
Start: 2025-06-29 | End: 2025-06-29

## 2025-06-29 RX ORDER — KETOROLAC TROMETHAMINE 30 MG/ML
15 INJECTION, SOLUTION INTRAMUSCULAR; INTRAVENOUS ONCE
Refills: 0 | Status: DISCONTINUED | OUTPATIENT
Start: 2025-06-29 | End: 2025-06-29

## 2025-06-29 RX ORDER — SODIUM CHLORIDE 9 G/1000ML
1000 INJECTION, SOLUTION INTRAVENOUS
Refills: 0 | Status: DISCONTINUED | OUTPATIENT
Start: 2025-06-29 | End: 2025-07-04

## 2025-06-29 RX ORDER — PIPERACILLIN-TAZO-DEXTROSE,ISO 3.375G/5
3.38 IV SOLUTION, PIGGYBACK PREMIX FROZEN(ML) INTRAVENOUS EVERY 8 HOURS
Refills: 0 | Status: DISCONTINUED | OUTPATIENT
Start: 2025-06-29 | End: 2025-07-04

## 2025-06-29 RX ORDER — PIPERACILLIN-TAZO-DEXTROSE,ISO 3.375G/5
3.38 IV SOLUTION, PIGGYBACK PREMIX FROZEN(ML) INTRAVENOUS ONCE
Refills: 0 | Status: COMPLETED | OUTPATIENT
Start: 2025-06-29 | End: 2025-06-29

## 2025-06-29 RX ORDER — LIDOCAINE HYDROCHLORIDE 20 MG/ML
1 JELLY TOPICAL DAILY
Refills: 0 | Status: DISCONTINUED | OUTPATIENT
Start: 2025-06-29 | End: 2025-07-04

## 2025-06-29 RX ORDER — ACETAMINOPHEN 500 MG/5ML
650 LIQUID (ML) ORAL EVERY 6 HOURS
Refills: 0 | Status: DISCONTINUED | OUTPATIENT
Start: 2025-06-29 | End: 2025-07-04

## 2025-06-29 RX ADMIN — Medication 650 MILLIGRAM(S): at 09:33

## 2025-06-29 RX ADMIN — Medication 650 MILLIGRAM(S): at 08:33

## 2025-06-29 RX ADMIN — METOPROLOL SUCCINATE 100 MILLIGRAM(S): 50 TABLET, EXTENDED RELEASE ORAL at 05:49

## 2025-06-29 RX ADMIN — SODIUM CHLORIDE 1500 MILLILITER(S): 9 INJECTION, SOLUTION INTRAVENOUS at 12:24

## 2025-06-29 RX ADMIN — LIDOCAINE HYDROCHLORIDE 1 PATCH: 20 JELLY TOPICAL at 18:23

## 2025-06-29 RX ADMIN — KETOROLAC TROMETHAMINE 15 MILLIGRAM(S): 30 INJECTION, SOLUTION INTRAMUSCULAR; INTRAVENOUS at 12:01

## 2025-06-29 RX ADMIN — KETOROLAC TROMETHAMINE 15 MILLIGRAM(S): 30 INJECTION, SOLUTION INTRAMUSCULAR; INTRAVENOUS at 13:01

## 2025-06-29 RX ADMIN — MIRTAZAPINE 15 MILLIGRAM(S): 30 TABLET, FILM COATED ORAL at 20:51

## 2025-06-29 RX ADMIN — LIDOCAINE HYDROCHLORIDE 1 PATCH: 20 JELLY TOPICAL at 21:42

## 2025-06-29 RX ADMIN — Medication 250 MILLIGRAM(S): at 13:32

## 2025-06-29 RX ADMIN — Medication 250 MILLILITER(S): at 09:52

## 2025-06-29 RX ADMIN — Medication 200 GRAM(S): at 12:02

## 2025-06-29 RX ADMIN — ROSUVASTATIN CALCIUM 20 MILLIGRAM(S): 5 TABLET, FILM COATED ORAL at 20:51

## 2025-06-29 RX ADMIN — GABAPENTIN 100 MILLIGRAM(S): 400 CAPSULE ORAL at 20:50

## 2025-06-29 RX ADMIN — GABAPENTIN 100 MILLIGRAM(S): 400 CAPSULE ORAL at 05:49

## 2025-06-29 RX ADMIN — SODIUM CHLORIDE 100 MILLILITER(S): 9 INJECTION, SOLUTION INTRAVENOUS at 14:01

## 2025-06-29 RX ADMIN — APIXABAN 2.5 MILLIGRAM(S): 2.5 TABLET, FILM COATED ORAL at 08:33

## 2025-06-29 RX ADMIN — Medication 25 GRAM(S): at 16:05

## 2025-06-29 RX ADMIN — LIDOCAINE HYDROCHLORIDE 1 PATCH: 20 JELLY TOPICAL at 09:46

## 2025-06-29 RX ADMIN — APIXABAN 2.5 MILLIGRAM(S): 2.5 TABLET, FILM COATED ORAL at 20:50

## 2025-06-29 NOTE — CONSULT NOTE ADULT - SUBJECTIVE AND OBJECTIVE BOX
Patient is a 89y old  Female who presents with a chief complaint of   HPI:  89 yo female, with PMHx of HTN, HLD, prior stroke in 2015 with residual left eye vision loss (bilateral occipital lobes and right posteromedial temporal lobe, right thalamic lacunar, right basal ganglia, right paramedian midbrain stroke), Afib (on Eliquis), cognitive decline (follows with Dr. Argueta) brought to the ED for inability to speak or ambulate. Pt found to have Acute left MCA territory infarctions on MR head. UA 6/24 with 4 WBC (neg), UCX 6/24, pt started on Ceftriaoxone. Pt symptoms initially improved. RRT called 6/29 for unresponsiveness. Pt found to be Hypotensive with rectal temperature 101.0. BCX and UCX collected and pt given Vanc and Zosyn. Unable to get any history from patient due to mental status       PAST MEDICAL & SURGICAL HISTORY:  Hypertension      Hyperlipidemia      CVA (cerebral vascular accident)      Afib      No significant past surgical history          Allergies  shellfish (Unknown)  No Known Drug Allergies    ANTIMICROBIALS (past 90 days)  MEDICATIONS  (STANDING):  cefTRIAXone   IVPB   100 mL/Hr IV Intermittent (06-24-25 @ 11:52)    cefTRIAXone   IVPB   100 mL/Hr IV Intermittent (06-28-25 @ 12:20)   100 mL/Hr IV Intermittent (06-27-25 @ 11:58)   100 mL/Hr IV Intermittent (06-26-25 @ 12:16)   100 mL/Hr IV Intermittent (06-25-25 @ 12:55)    piperacillin/tazobactam IVPB.   200 mL/Hr IV Intermittent (06-29-25 @ 12:02)    vancomycin  IVPB   250 mL/Hr IV Intermittent (06-29-25 @ 13:32)        piperacillin/tazobactam IVPB.. 3.375 every 8 hours  vancomycin  IVPB 750 every 12 hours    MEDICATIONS  (STANDING):  acetaminophen     Tablet .. 650 once  acetaminophen     Tablet .. 650 every 6 hours PRN  acetaminophen   IVPB .. 725 once  apixaban 2.5 every 12 hours  gabapentin 100 three times a day  mirtazapine 15 at bedtime  rosuvastatin 20 at bedtime    SOCIAL HISTORY: Unable to obtain due to mental status     FAMILY HISTORY: Unable to obtain due to mental status       REVIEW OF SYSTEMS  [  ] ROS unobtainable because: Unable to obtain due to mental status     Vital Signs Last 24 Hrs  T(F): 97.3 (06-29-25 @ 13:41), Max: 100.6 (06-28-25 @ 23:39)  Vital Signs Last 24 Hrs  HR: 66 (06-29-25 @ 13:41) (66 - 106)  BP: 95/58 (06-29-25 @ 13:41) (73/46 - 129/63)  RR: 18 (06-29-25 @ 13:41)  SpO2: 95% (06-29-25 @ 13:41) (92% - 95%)  Wt(kg): --    PHYSICAL EXAM:  Constitutional: non-toxic, no distress  HEAD/EYES: anicteric, no conjunctival injection  ENT:  supple, no thrush  Cardiovascular:   normal S1, S2, no murmur, no edema  Respiratory:  clear BS bilaterally, no wheezes, no rales  GI:  soft, non-tender  Neurologic: Altered   Skin:  no rash, no erythema, no phlebitis                            13.4   13.10 )-----------( 216      ( 29 Jun 2025 02:30 )             40.8   06-29    135  |  98  |  12  ----------------------------<  134[H]  3.9   |  23  |  0.70    Ca    8.9      29 Jun 2025 02:30    TPro  7.2  /  Alb  3.6  /  TBili  1.2  /  DBili  x   /  AST  36  /  ALT  34  /  AlkPhos  64  06-29    Urinalysis with Rflx Culture (06.24.25 @ 10:58)    Urine Appearance: Clear   Color: Yellow   Specific Gravity: 1.021   pH Urine: 7.5   Protein, Urine: Negative mg/dL   Glucose Qualitative, Urine: Negative mg/dL   Ketone , Urine: Negative mg/dL   Blood, Urine: Negative   Bilirubin: Negative   Urobilinogen: 0.2 mg/dL   Leukocyte Esterase Concentration: Small   Nitrite: Positive    Urine Microscopic-Add On (NC) (06.24.25 @ 10:58)    White Blood Cell - Urine: 4 /HPF   Red Blood Cell - Urine: 1 /HPF   Bacteria: Many /HPF   Cast: 2 /LPF   Epithelial Cells: 0 /HPF    MICROBIOLOGY:              RADIOLOGY:  imaging below personally reviewed and agree with findings    < from: CT Head No Cont (06.29.25 @ 13:35) >    IMPRESSION:  Small acute to subacute infarct in the left frontal cortex seen better on   MRI 6/25/2025. No hemorrhage.  Chronic bilateral occipital infarcts and a right thalamic lacunar infarct   is again noted.      < from: MR Head No Cont (06.25.25 @ 20:50) >  IMPRESSION: Acute left MCA territory infarctions      < from: CT Angio Neck Stroke Protocol w/ IV Cont (06.24.25 @ 10:18) >    IMPRESSION:    CT PERFUSION: No perfusion deficit.    CTA NECK AND HEAD: No large vessel occlusion or major stenosis.             Patient is a 89y old  Female who presents with a chief complaint of   HPI:  88 yo female, with PMHx of HTN, HLD, prior stroke in 2015 with residual left eye vision loss (bilateral occipital lobes and right posteromedial temporal lobe, right thalamic lacunar, right basal ganglia, right paramedian midbrain stroke), Afib (on Eliquis), cognitive decline (follows with Dr. Argueta) brought to the ED for inability to speak or ambulate. Pt found to have Acute left MCA territory infarctions on MR head. UA 6/24 with 4 WBC (neg), UCX 6/24, pt started on Ceftriaoxone. Pt symptoms initially improved. RRT called 6/29 for unresponsiveness. Pt found to be Hypotensive with rectal temperature 101.0. BCX and UCX collected and pt given Vanc and Zosyn. Unable to get any history from patient due to mental status       PAST MEDICAL & SURGICAL HISTORY:  Hypertension      Hyperlipidemia      CVA (cerebral vascular accident)      Afib      No significant past surgical history          Allergies  shellfish (Unknown)  No Known Drug Allergies    ANTIMICROBIALS (past 90 days)  MEDICATIONS  (STANDING):  cefTRIAXone   IVPB   100 mL/Hr IV Intermittent (06-24-25 @ 11:52)    cefTRIAXone   IVPB   100 mL/Hr IV Intermittent (06-28-25 @ 12:20)   100 mL/Hr IV Intermittent (06-27-25 @ 11:58)   100 mL/Hr IV Intermittent (06-26-25 @ 12:16)   100 mL/Hr IV Intermittent (06-25-25 @ 12:55)    piperacillin/tazobactam IVPB.   200 mL/Hr IV Intermittent (06-29-25 @ 12:02)    vancomycin  IVPB   250 mL/Hr IV Intermittent (06-29-25 @ 13:32)        piperacillin/tazobactam IVPB.. 3.375 every 8 hours  vancomycin  IVPB 750 every 12 hours    MEDICATIONS  (STANDING):  acetaminophen     Tablet .. 650 once  acetaminophen     Tablet .. 650 every 6 hours PRN  acetaminophen   IVPB .. 725 once  apixaban 2.5 every 12 hours  gabapentin 100 three times a day  mirtazapine 15 at bedtime  rosuvastatin 20 at bedtime    SOCIAL HISTORY: Unable to obtain due to mental status   No documented history of IVDU or tobacco use    FAMILY HISTORY: Unable to obtain due to mental status   No documented family history of CVA    REVIEW OF SYSTEMS  [  ] ROS unobtainable because: Unable to obtain due to mental status     Vital Signs Last 24 Hrs  T(F): 97.3 (06-29-25 @ 13:41), Max: 100.6 (06-28-25 @ 23:39)  Vital Signs Last 24 Hrs  HR: 66 (06-29-25 @ 13:41) (66 - 106)  BP: 95/58 (06-29-25 @ 13:41) (73/46 - 129/63)  RR: 18 (06-29-25 @ 13:41)  SpO2: 95% (06-29-25 @ 13:41) (92% - 95%)  Wt(kg): --    PHYSICAL EXAM:  Constitutional: non-toxic, no distress  HEAD/EYES: anicteric, no conjunctival injection  ENT:  supple, no thrush  Cardiovascular:   normal S1, S2, no murmur, no edema  Respiratory:  clear BS bilaterally, no wheezes, no rales  GI:  soft, non-tender  Neurologic: Altered   Skin:  no rash, no erythema, no phlebitis                            13.4   13.10 )-----------( 216      ( 29 Jun 2025 02:30 )             40.8   06-29    135  |  98  |  12  ----------------------------<  134[H]  3.9   |  23  |  0.70    Ca    8.9      29 Jun 2025 02:30    TPro  7.2  /  Alb  3.6  /  TBili  1.2  /  DBili  x   /  AST  36  /  ALT  34  /  AlkPhos  64  06-29    Urinalysis with Rflx Culture (06.24.25 @ 10:58)    Urine Appearance: Clear   Color: Yellow   Specific Gravity: 1.021   pH Urine: 7.5   Protein, Urine: Negative mg/dL   Glucose Qualitative, Urine: Negative mg/dL   Ketone , Urine: Negative mg/dL   Blood, Urine: Negative   Bilirubin: Negative   Urobilinogen: 0.2 mg/dL   Leukocyte Esterase Concentration: Small   Nitrite: Positive    Urine Microscopic-Add On (NC) (06.24.25 @ 10:58)    White Blood Cell - Urine: 4 /HPF   Red Blood Cell - Urine: 1 /HPF   Bacteria: Many /HPF   Cast: 2 /LPF   Epithelial Cells: 0 /HPF    MICROBIOLOGY:    No cultures available            RADIOLOGY:  imaging below personally reviewed and agree with findings    < from: CT Head No Cont (06.29.25 @ 13:35) >    IMPRESSION:  Small acute to subacute infarct in the left frontal cortex seen better on   MRI 6/25/2025. No hemorrhage.  Chronic bilateral occipital infarcts and a right thalamic lacunar infarct   is again noted.      < from: MR Head No Cont (06.25.25 @ 20:50) >  IMPRESSION: Acute left MCA territory infarctions      < from: CT Angio Neck Stroke Protocol w/ IV Cont (06.24.25 @ 10:18) >    IMPRESSION:    CT PERFUSION: No perfusion deficit.    CTA NECK AND HEAD: No large vessel occlusion or major stenosis.

## 2025-06-29 NOTE — CHART NOTE - NSCHARTNOTEFT_GEN_A_CORE
symptomsMedicine ACP chart note     CC; New fever  Notified by RN that patient  febrile with temp of 100.6 F  Evaluate the pt at bedside, Patient with no acute change in mental status  Asymptomatic, not sick appearing     Vital Signs Last 24 Hrs  T(C): 38.1 (28 Jun 2025 23:39), Max: 38.1 (28 Jun 2025 23:39)  T(F): 100.6 (28 Jun 2025 23:39), Max: 100.6 (28 Jun 2025 23:39)  HR: 106 (28 Jun 2025 23:39) (64 - 106)  BP: 111/74 (28 Jun 2025 23:39) (111/74 - 136/78)  BP(mean): --  RR: 18 (28 Jun 2025 23:39) (18 - 18)  SpO2: 93% (28 Jun 2025 23:39) (93% - 95%)    Parameters below as of 28 Jun 2025 23:39  Patient On (Oxygen Delivery Method): room air        89 yo female with PMH HTN, hx CVA, afib on eliquis, HLD presents with slurred speech.    Dx:         -Slurred speech, R hand weakness s/p code stroke, CTH/N negative, neuro following>>> Acute left MCA territory infarctions on MR 6/25.                 -Afib on Eliquis                -UTI on CTX  	RLE pain - duplex neg, gabapentin started 6/28    Now with low grade fever of 100.6F  VSS  no change in mental status   UA positive for ITim, likely causing fever  No URi symptoms   Blood cx 2 sets, Urine culture , lactic , procalcitonin ordered  No cxr this admission, CXr ordered to r/o acute lung pathology  Coved/flu tests ordered  Trend fever' Tylenol 650mg po 1x robert for fever  Will continue to monitor  Will sign out to day team    Kiko Garcia [facility]  [id number]

## 2025-06-29 NOTE — PROGRESS NOTE ADULT - SUBJECTIVE AND OBJECTIVE BOX
KYLIE BENJAMIN  89y Female  MRN:[id number]    Patient is a 89y old  Female who presents with a chief complaint of slurred speech    HPI:   89 yo female, with PMHx of HTN, HLD, prior stroke in 2015 with residual left eye vision loss (bilateral occipital lobes and right posteromedial temporal lobe, right thalamic lacunar, right basal ganglia, right paramedian midbrain stroke), Afib (on Eliquis), cognitive decline (follows with Dr. Argueta), BIBEMS due to inability to speak or ambulate. LKW at around 3 AM on 6/24/2025. Daughter at bedside reports that she noted that patient was only making grunting sounds in response to conversation and appeared to have difficulty recognizing her daughter when she saw patient on 6/24/2025 at around 9 AM. However, daughter reports that patient has now returned to baseline. Of note, daughter stated that patient was reporting abdominal pain on 6/23/2025. Ambulates using a cane at baseline, requires assistance with eating and bathing. Reports adherence to Eliquis currently but reportedly had not been taking Eliquis for about 1 month for unknown reasons. Translation from Cantonese provided by daughter at bedside per patient and family preference.  (24 Jun 2025 18:58)      Patient seen and evaluated at bedside. No acute events overnight except as noted.    Interval HPI: RRT for fever and hypotension       PAST MEDICAL & SURGICAL HISTORY:  Hypertension      Hyperlipidemia      CVA (cerebral vascular accident)      Afib      No significant past surgical history          REVIEW OF SYSTEMS:  as per hpi       VITALS:   Vital Signs Last 24 Hrs  T(C): 36.3 (29 Jun 2025 16:16), Max: 38.1 (28 Jun 2025 23:39)  T(F): 97.4 (29 Jun 2025 16:16), Max: 100.6 (28 Jun 2025 23:39)  HR: 64 (29 Jun 2025 18:18) (64 - 106)  BP: 119/67 (29 Jun 2025 18:18) (73/46 - 119/67)  BP(mean): --  RR: 18 (29 Jun 2025 16:16) (18 - 18)  SpO2: 95% (29 Jun 2025 16:16) (92% - 95%)    Parameters below as of 29 Jun 2025 16:16  Patient On (Oxygen Delivery Method): room air            PHYSICAL EXAM:  GENERAL: NAD, well-developed, lethargic   HEAD:  Atraumatic, Normocephalic  EYES: EOMI, PERRLA, conjunctiva and sclera clear  NECK: Supple, No JVD  CHEST/LUNG: Clear to auscultation bilaterally; No wheeze  HEART: S1, S2; No murmurs, rubs, or gallops  ABDOMEN: Soft, Nontender, Nondistended; Bowel sounds present  EXTREMITIES:  2+ Peripheral Pulses, No clubbing, cyanosis, or edema  NEUROLOGY: AAOX3; +dysarthria   SKIN: No rashes or lesions    Consultant(s) Notes Reviewed:  [x ] YES  [ ] NO  Care Discussed with Consultants/Other Providers [ x] YES  [ ] NO    MEDS:   MEDICATIONS  (STANDING):  acetaminophen     Tablet .. 650 milliGRAM(s) Oral once  acetaminophen   IVPB .. 725 milliGRAM(s) IV Intermittent once  apixaban 2.5 milliGRAM(s) Oral every 12 hours  gabapentin 100 milliGRAM(s) Oral three times a day  lactated ringers. 1000 milliLiter(s) (100 mL/Hr) IV Continuous <Continuous>  lidocaine   4% Patch 1 Patch Transdermal daily  mirtazapine 15 milliGRAM(s) Oral at bedtime  piperacillin/tazobactam IVPB.. 3.375 Gram(s) IV Intermittent every 8 hours  rosuvastatin 20 milliGRAM(s) Oral at bedtime  vancomycin  IVPB 750 milliGRAM(s) IV Intermittent every 12 hours    MEDICATIONS  (PRN):  acetaminophen     Tablet .. 650 milliGRAM(s) Oral every 6 hours PRN Moderate Pain (4 - 6)        ALLERGIES:  shellfish (Unknown)  No Known Drug Allergies      LABS:                        13.4   13.10 )-----------( 216      ( 29 Jun 2025 02:30 )             40.8       06-29    135  |  98  |  12  ----------------------------<  134[H]  3.9   |  23  |  0.70    Ca    8.9      29 Jun 2025 02:30    TPro  7.2  /  Alb  3.6  /  TBili  1.2  /  DBili  x   /  AST  36  /  ALT  34  /  AlkPhos  64  06-29  < from: CT Head No Cont (06.29.25 @ 13:35) >    IMPRESSION:  Small acute to subacute infarct in the left frontal cortex seen better on   MRI 6/25/2025. No hemorrhage.  Chronic bilateral occipital infarcts and a right thalamic lacunar infarct   is again noted.      < end of copied text >                 < from: VA Duplex Lower Ext Vein Scan, Bilat (06.27.25 @ 18:09) >  IMPRESSION:  No evidence of deep venous thrombosis in either lower extremity.        < end of copied text >                                   < from: TTE Limited W or WO Ultrasound Enhancing Agent (06.26.25 @ 14:40) >  _______________________________________________________________________________________     CONCLUSIONS:      1. Left ventricular cavity is small. Left ventricular wall thickness is normal. Left ventricular systolic function is normal with an ejection fraction of 57 % by Denson's method of disks. There are no regional wall motion abnormalities seen.   2. Normal right ventricular cavity size, with normal wall thickness, and normal right ventricular systolic function. Tricuspid annular plane systolic excursion (TAPSE) is 2.0 cm (normal >=1.7 cm).   3. Left atrium is mildly dilated.   4. The right atrium is mildly dilated.   5. No pericardial effusion seen.   6. No prior echocardiogram is available for comparison.   7. Agitated saline injection was negative for intracardiac shunt.   8. Severe tricuspid regurgitation.   9. There is normal LV mass and concentric remodeling.    ________________________________________________________________________________________    < end of copied text >      < from: MR Head No Cont (06.25.25 @ 20:50) >    IMPRESSION: Acute left MCA territory infarctions    --- End of Report ---        < end of copied text >           < from: CT Angio Neck Stroke Protocol w/ IV Cont (06.24.25 @ 10:18) >  IMPRESSION:    CT PERFUSION: No perfusion deficit.    CTA NECK AND HEAD: No large vessel occlusion or major stenosis.    --- End of Report ---      < end of copied text >

## 2025-06-29 NOTE — CONSULT NOTE ADULT - ASSESSMENT
88 yo female, with PMHx of HTN, HLD, prior stroke in 2015 with residual left eye vision loss (bilateral occipital lobes and right posteromedial temporal lobe, right thalamic lacunar, right basal ganglia, right paramedian midbrain stroke), Afib (on Eliquis), cognitive decline (follows with Dr. Argueta) brought to the ED for inability to speak or ambulate. Pt found to have Acute left MCA territory infarctions on MR head. UA 6/24 with 4 WBC (neg), UCX 6/24, pt started on Ceftriaoxone. Pt symptoms initially improved. RRT called 6/29 for unresponsiveness. Pt found to be Hypotensive with rectal temperature 101.0. BCX and UCX collected and pt given Vanc and Zosyn. Unable to get any history from patient due to mental status     Assessment:  #AMS  #Fever  -RRT called 6/29 for unresponsiveness. Pt found to be Hypotensive with rectal temperature 101.0  -UA on admission 6/24 with 4 WBC (neg), UCX 6/24, pt started on Ceftriaoxone.   -Now abx switched to Vanc and Zosyn  -BCX and repeat UCX pending  -CT Head 6/29: Small acute to subacute infarct in the left frontal cortex seen better on MRI 6/25/2025. No hemorrhage. Chronic bilateral occipital infarcts and a right thalamic lacunar infarct is again noted.    Recommendation:  -Cont Vanc and Zosyn for now  -F/U BCX and UCX collected during RRT  -Check CT C/A/P (with contrast if possible) to r/o infectious source  -Cont to monitor fever and WBC curve  -F/U Neurology recs    Arvin BONILLA Fellow

## 2025-06-29 NOTE — PROGRESS NOTE ADULT - ASSESSMENT
89 female h/o cva, htn, chol, afib on eliquis, here with r/o cva     cva  neuro consult f/u  CT noted   MRI noted   tte noted  serial neuro exams  eliquis/statin  neuro f/u noted  PT/OT     uti  s/p ceftriaxone     afib  AC with eliquis  rate control    lower ext pain  f/u dopplers r/o dvt - neg  trial of gabapentin     new fever and hypotension  abx changed to zosyn  f/u repeat cult  f/u CT c/a/p  ID consult f/u      cont other home meds      PT    d/w daughter bedside     I reviewed the course of events on the unit, re-confirming the patient history.   I discussed the care with the patient and their family. The plan of care was discussed with the ACP team.  Advanced care planning was discussed with patient and family.  Advanced care planning forms were reviewed and discussed as appropriate.  Differential diagnosis and plan of care discussed with patient after the evaluation.   Pain assessed and judicious use of narcotics when appropriate was discussed.  Importance of Fall prevention discussed.  Counseling on Smoking and Alcohol cessation was offered when appropriate.  Counseling on Diet, exercise, and medication compliance was done.

## 2025-06-29 NOTE — CONSULT NOTE ADULT - ATTENDING COMMENTS
89-yo F w/ PMH of CVA (2015) w/ residual L vision loss, afib on Eliquis, and HTN, admitted for inability to speak or ambulate. Acute L MCA territory infarction noted. UA 6/24 unremarkable. UCx w/ >100k CFU/mL pansensitive E coli, s/p ceftriaxone 6/24-28. RRT on 6/29 for unresponsiveness, found to be hypotensive and febrile to 101. Started on vancomycin and Zosyn. Need for infectious workup. Aspiration should be included in differential.    #AMS  #CVA  #Fever  #Hypotension    Recommendations:  - BCx and UCx repeat  - CT C/A/P  - Procal  - Continue with Zosyn 3.375 g IV Q8H  - Monitor WBC and VS  - F/u Neuro    Topics relating to disease transmission risk assessment and mitigation, complex antimicrobial therapy counseling and treatment, and/or public health investigation, analysis, and testing were addressed.    Thank you for this consult. Inpatient ID team will follow.  Plan discussed with primary team clinician.  Discussed with daughter.      Gigi Miller MD, PhD  Attending Physician  [facility]ious [facility]  Department of Medicine    Please contact through MS Teams message.  Office: [phone] (after 5 PM or weekend) 89-yo F w/ PMH of CVA (2015) w/ residual L vision loss, afib on Eliquis, and HTN, admitted for inability to speak or ambulate. Acute L MCA territory infarction noted. UA 6/24 unremarkable. UCx w/ >100k CFU/mL pansensitive E coli, s/p ceftriaxone 6/24-28. RRT on 6/29 for unresponsiveness, found to be hypotensive and febrile to 101. Started on vancomycin and Zosyn. Need for infectious workup. Aspiration should be included in differential.    #AMS  #CVA  #Fever  #Hypotension  #Debility  #At risk for aspiration    Recommendations:  - BCx and UCx repeat  - CT C/A/P  - Procal  - Continue with Zosyn 3.375 g IV Q8H  - Monitor WBC and VS  - F/u Neuro  - Bed head elevation. Aspiration precautions.    Topics relating to disease transmission risk assessment and mitigation, complex antimicrobial therapy counseling and treatment, and/or public health investigation, analysis, and testing were addressed.    Thank you for this consult. Inpatient ID team will follow.  Plan discussed with primary team clinician.  Discussed with daughter.      Gigi Miller MD, PhD  Attending Physician  [facility]ious [facility]  Department of Medicine    Please contact through MS Teams message.  Office: [phone] (after 5 PM or weekend)

## 2025-06-29 NOTE — CHART NOTE - NSCHARTNOTEFT_GEN_A_CORE
RRT was called by bedside rn for hypotension, AMS and fever.  please RRTnote  Dr. Lyons, neuro and stoke team made aware

## 2025-06-29 NOTE — RAPID RESPONSE TEAM SUMMARY - NSSITUATIONBACKGROUNDRRT_GEN_ALL_CORE
RRT called for decreased responsiveness. Hypotensive on presentation, rectal temperature 101.0, , O2 92 placed on NRB very briefly for comfort prior to NC. Briefly, patient is an 89F with hx HTN, CVA, afib on Eliquis who presented with slurred speech found to have an acute L MCA stroke. Also note to have a UTI on arrival 6/24 initiated CTX for empiric treatment, found to have pansensitive E.coli UTI. At time of RRT, coverage broadened for pseudomonal coverage w/ Zosyn and additional coverage w/ Vancomycin. Ordered sepsis bundle w/ 1.5 L IVF. BCx and UCx in lab, updated labs showed new leukocytosis to 13K. Ordered comprehensive VBG w/ lytes which was benign with lactate 1.3 pCO2 40. Patient had received Tylenol at 8:30AM, ordered Ketorolac 15mg x 1. Pressures remained soft however fluid responsive w/BP 85/49 improved to 105/55 w/ cuff resizing and bolused fluids. Patient was responsive to noxious stimuli and able to follow basic verbal command at bedside (opens eyes, squeezed fingers). Discussed with primary team to complete 2L total of bolus IVF (additional 500cc from 1.5 L administered in RRT) then mIVF LR 100cc/h. Zosyn load completed, c/w Vancomycin, f/u BCx/UCx then f/u bladder scan to ensure no urinary retention. Given recent fever and leukocytosis additional imaging not completed as per RRT however informed primary to consider CTC/A/P if persistently febrile on broad spectrum coverage to evaluate for source control r/o abscess or pyelonephritis etc. Primary to f/u with family.  RRT called for decreased responsiveness. Hypotensive on presentation, rectal temperature 101.0, , O2 92 placed on NRB very briefly for comfort prior to NC. Briefly, patient is an 89F with hx HTN, CVA, afib on Eliquis who presented with slurred speech found to have an acute L MCA stroke. Also note to have a UTI on arrival 6/24 initiated CTX for empiric treatment, found to have pansensitive E.coli UTI. At time of RRT, coverage broadened for pseudomonal coverage w/ Zosyn and additional coverage w/ Vancomycin. Ordered sepsis bundle w/ 1.5 L IVF. BCx and UCx in lab, updated labs showed new leukocytosis to 13K. Ordered comprehensive VBG w/ lytes which was benign with lactate 1.3 pCO2 40. Patient had received Tylenol at 8:30AM, ordered Ketorolac 15mg x 1. Pressures remained soft however fluid responsive w/BP 85/49 improved to 105/55 w/ cuff resizing and bolused fluids. Patient was responsive to noxious stimuli and able to follow basic verbal command at bedside (opens eyes, squeezed fingers). Discussed with primary team to complete 2L total of bolus IVF (additional 500cc from 1.5 L administered in RRT) then mIVF LR 100cc/h. Zosyn load completed, c/w Vancomycin, f/u BCx/UCx then f/u bladder scan to ensure no urinary retention. Given recent fever and leukocytosis additional imaging not completed as per RRT however informed primary to consider CXR (high risk aspiration given recent stroke), CTC/A/P if persistently febrile on broad spectrum coverage to evaluate for source control r/o abscess or pyelonephritis etc. Primary to f/u with family.

## 2025-06-30 LAB
ANION GAP SERPL CALC-SCNC: 14 MMOL/L — SIGNIFICANT CHANGE UP (ref 5–17)
BUN SERPL-MCNC: 16 MG/DL — SIGNIFICANT CHANGE UP (ref 7–23)
CALCIUM SERPL-MCNC: 8.1 MG/DL — LOW (ref 8.4–10.5)
CHLORIDE SERPL-SCNC: 101 MMOL/L — SIGNIFICANT CHANGE UP (ref 96–108)
CO2 SERPL-SCNC: 20 MMOL/L — LOW (ref 22–31)
CREAT SERPL-MCNC: 0.84 MG/DL — SIGNIFICANT CHANGE UP (ref 0.5–1.3)
CULTURE RESULTS: SIGNIFICANT CHANGE UP
EGFR: 66 ML/MIN/1.73M2 — SIGNIFICANT CHANGE UP
EGFR: 66 ML/MIN/1.73M2 — SIGNIFICANT CHANGE UP
GLUCOSE SERPL-MCNC: 125 MG/DL — HIGH (ref 70–99)
HCT VFR BLD CALC: 41 % — SIGNIFICANT CHANGE UP (ref 34.5–45)
HCT VFR BLD CALC: 42.1 % — SIGNIFICANT CHANGE UP (ref 34.5–45)
HGB BLD-MCNC: 13.2 G/DL — SIGNIFICANT CHANGE UP (ref 11.5–15.5)
HGB BLD-MCNC: 13.5 G/DL — SIGNIFICANT CHANGE UP (ref 11.5–15.5)
LACTATE SERPL-SCNC: 2.2 MMOL/L — HIGH (ref 0.5–2)
LACTATE SERPL-SCNC: 3 MMOL/L — HIGH (ref 0.5–2)
MAGNESIUM SERPL-MCNC: 1.6 MG/DL — SIGNIFICANT CHANGE UP (ref 1.6–2.6)
MAGNESIUM SERPL-MCNC: 1.7 MG/DL — SIGNIFICANT CHANGE UP (ref 1.6–2.6)
MCHC RBC-ENTMCNC: 30.1 PG — SIGNIFICANT CHANGE UP (ref 27–34)
MCHC RBC-ENTMCNC: 30.5 PG — SIGNIFICANT CHANGE UP (ref 27–34)
MCHC RBC-ENTMCNC: 32.1 G/DL — SIGNIFICANT CHANGE UP (ref 32–36)
MCHC RBC-ENTMCNC: 32.2 G/DL — SIGNIFICANT CHANGE UP (ref 32–36)
MCV RBC AUTO: 93.8 FL — SIGNIFICANT CHANGE UP (ref 80–100)
MCV RBC AUTO: 94.7 FL — SIGNIFICANT CHANGE UP (ref 80–100)
NRBC # BLD AUTO: 0 K/UL — SIGNIFICANT CHANGE UP (ref 0–0)
NRBC # BLD AUTO: 0 K/UL — SIGNIFICANT CHANGE UP (ref 0–0)
NRBC # FLD: 0 K/UL — SIGNIFICANT CHANGE UP (ref 0–0)
NRBC # FLD: 0 K/UL — SIGNIFICANT CHANGE UP (ref 0–0)
NRBC BLD AUTO-RTO: 0 /100 WBCS — SIGNIFICANT CHANGE UP (ref 0–0)
NRBC BLD AUTO-RTO: 0 /100 WBCS — SIGNIFICANT CHANGE UP (ref 0–0)
PHOSPHATE SERPL-MCNC: 2.8 MG/DL — SIGNIFICANT CHANGE UP (ref 2.5–4.5)
PHOSPHATE SERPL-MCNC: 3.1 MG/DL — SIGNIFICANT CHANGE UP (ref 2.5–4.5)
PLATELET # BLD AUTO: 173 K/UL — SIGNIFICANT CHANGE UP (ref 150–400)
PLATELET # BLD AUTO: 202 K/UL — SIGNIFICANT CHANGE UP (ref 150–400)
PMV BLD: 10.8 FL — SIGNIFICANT CHANGE UP (ref 7–13)
PMV BLD: 11.1 FL — SIGNIFICANT CHANGE UP (ref 7–13)
POTASSIUM SERPL-MCNC: 4 MMOL/L — SIGNIFICANT CHANGE UP (ref 3.5–5.3)
POTASSIUM SERPL-SCNC: 4 MMOL/L — SIGNIFICANT CHANGE UP (ref 3.5–5.3)
PROCALCITONIN SERPL-MCNC: 0.42 NG/ML — HIGH (ref 0.02–0.1)
RBC # BLD: 4.33 M/UL — SIGNIFICANT CHANGE UP (ref 3.8–5.2)
RBC # BLD: 4.49 M/UL — SIGNIFICANT CHANGE UP (ref 3.8–5.2)
RBC # FLD: 12.1 % — SIGNIFICANT CHANGE UP (ref 10.3–14.5)
RBC # FLD: 12.2 % — SIGNIFICANT CHANGE UP (ref 10.3–14.5)
SODIUM SERPL-SCNC: 135 MMOL/L — SIGNIFICANT CHANGE UP (ref 135–145)
SPECIMEN SOURCE: SIGNIFICANT CHANGE UP
WBC # BLD: 10.18 K/UL — SIGNIFICANT CHANGE UP (ref 3.8–10.5)
WBC # BLD: 14.24 K/UL — HIGH (ref 3.8–10.5)
WBC # FLD AUTO: 10.18 K/UL — SIGNIFICANT CHANGE UP (ref 3.8–10.5)
WBC # FLD AUTO: 14.24 K/UL — HIGH (ref 3.8–10.5)

## 2025-06-30 PROCEDURE — 70498 CT ANGIOGRAPHY NECK: CPT

## 2025-06-30 PROCEDURE — 70496 CT ANGIOGRAPHY HEAD: CPT

## 2025-06-30 PROCEDURE — 0241U: CPT

## 2025-06-30 PROCEDURE — 85025 COMPLETE CBC W/AUTO DIFF WBC: CPT

## 2025-06-30 PROCEDURE — 97166 OT EVAL MOD COMPLEX 45 MIN: CPT

## 2025-06-30 PROCEDURE — 87186 SC STD MICRODIL/AGAR DIL: CPT

## 2025-06-30 PROCEDURE — 84295 ASSAY OF SERUM SODIUM: CPT

## 2025-06-30 PROCEDURE — 84484 ASSAY OF TROPONIN QUANT: CPT

## 2025-06-30 PROCEDURE — 97110 THERAPEUTIC EXERCISES: CPT

## 2025-06-30 PROCEDURE — 93010 ELECTROCARDIOGRAM REPORT: CPT | Mod: 76

## 2025-06-30 PROCEDURE — 82330 ASSAY OF CALCIUM: CPT

## 2025-06-30 PROCEDURE — 85730 THROMBOPLASTIN TIME PARTIAL: CPT

## 2025-06-30 PROCEDURE — 83605 ASSAY OF LACTIC ACID: CPT

## 2025-06-30 PROCEDURE — 83036 HEMOGLOBIN GLYCOSYLATED A1C: CPT

## 2025-06-30 PROCEDURE — 0042T: CPT

## 2025-06-30 PROCEDURE — 80053 COMPREHEN METABOLIC PANEL: CPT

## 2025-06-30 PROCEDURE — 85018 HEMOGLOBIN: CPT

## 2025-06-30 PROCEDURE — 80048 BASIC METABOLIC PNL TOTAL CA: CPT

## 2025-06-30 PROCEDURE — 81001 URINALYSIS AUTO W/SCOPE: CPT

## 2025-06-30 PROCEDURE — 84100 ASSAY OF PHOSPHORUS: CPT

## 2025-06-30 PROCEDURE — 85027 COMPLETE CBC AUTOMATED: CPT

## 2025-06-30 PROCEDURE — 93970 EXTREMITY STUDY: CPT

## 2025-06-30 PROCEDURE — 82435 ASSAY OF BLOOD CHLORIDE: CPT

## 2025-06-30 PROCEDURE — 84145 PROCALCITONIN (PCT): CPT

## 2025-06-30 PROCEDURE — 80061 LIPID PANEL: CPT

## 2025-06-30 PROCEDURE — 83735 ASSAY OF MAGNESIUM: CPT

## 2025-06-30 PROCEDURE — 71045 X-RAY EXAM CHEST 1 VIEW: CPT

## 2025-06-30 PROCEDURE — 97162 PT EVAL MOD COMPLEX 30 MIN: CPT

## 2025-06-30 PROCEDURE — 70551 MRI BRAIN STEM W/O DYE: CPT

## 2025-06-30 PROCEDURE — 85610 PROTHROMBIN TIME: CPT

## 2025-06-30 PROCEDURE — 85014 HEMATOCRIT: CPT

## 2025-06-30 PROCEDURE — 74177 CT ABD & PELVIS W/CONTRAST: CPT

## 2025-06-30 PROCEDURE — 84132 ASSAY OF SERUM POTASSIUM: CPT

## 2025-06-30 PROCEDURE — 87040 BLOOD CULTURE FOR BACTERIA: CPT

## 2025-06-30 PROCEDURE — 82803 BLOOD GASES ANY COMBINATION: CPT

## 2025-06-30 PROCEDURE — 93005 ELECTROCARDIOGRAM TRACING: CPT

## 2025-06-30 PROCEDURE — 87086 URINE CULTURE/COLONY COUNT: CPT

## 2025-06-30 PROCEDURE — 97112 NEUROMUSCULAR REEDUCATION: CPT

## 2025-06-30 PROCEDURE — 82962 GLUCOSE BLOOD TEST: CPT

## 2025-06-30 PROCEDURE — G0545: CPT

## 2025-06-30 PROCEDURE — 36415 COLL VENOUS BLD VENIPUNCTURE: CPT

## 2025-06-30 PROCEDURE — 71260 CT THORAX DX C+: CPT

## 2025-06-30 PROCEDURE — 70450 CT HEAD/BRAIN W/O DYE: CPT

## 2025-06-30 PROCEDURE — 82947 ASSAY GLUCOSE BLOOD QUANT: CPT

## 2025-06-30 PROCEDURE — 99232 SBSQ HOSP IP/OBS MODERATE 35: CPT

## 2025-06-30 PROCEDURE — 93306 TTE W/DOPPLER COMPLETE: CPT

## 2025-06-30 PROCEDURE — 99231 SBSQ HOSP IP/OBS SF/LOW 25: CPT

## 2025-06-30 RX ORDER — SALINE 7; 19 G/118ML; G/118ML
1 ENEMA RECTAL ONCE
Refills: 0 | Status: COMPLETED | OUTPATIENT
Start: 2025-06-30 | End: 2025-06-30

## 2025-06-30 RX ORDER — METOPROLOL SUCCINATE 50 MG/1
5 TABLET, EXTENDED RELEASE ORAL ONCE
Refills: 0 | Status: COMPLETED | OUTPATIENT
Start: 2025-06-30 | End: 2025-06-30

## 2025-06-30 RX ORDER — POLYETHYLENE GLYCOL 3350 17 G/17G
17 POWDER, FOR SOLUTION ORAL DAILY
Refills: 0 | Status: DISCONTINUED | OUTPATIENT
Start: 2025-06-30 | End: 2025-07-04

## 2025-06-30 RX ORDER — SENNA 187 MG
2 TABLET ORAL AT BEDTIME
Refills: 0 | Status: DISCONTINUED | OUTPATIENT
Start: 2025-06-30 | End: 2025-07-04

## 2025-06-30 RX ORDER — METOPROLOL SUCCINATE 50 MG/1
50 TABLET, EXTENDED RELEASE ORAL
Refills: 0 | Status: DISCONTINUED | OUTPATIENT
Start: 2025-06-30 | End: 2025-07-02

## 2025-06-30 RX ORDER — BISACODYL 5 MG
10 TABLET, DELAYED RELEASE (ENTERIC COATED) ORAL ONCE
Refills: 0 | Status: COMPLETED | OUTPATIENT
Start: 2025-06-30 | End: 2025-06-30

## 2025-06-30 RX ORDER — POLYETHYLENE GLYCOL 3350 17 G/17G
17 POWDER, FOR SOLUTION ORAL ONCE
Refills: 0 | Status: COMPLETED | OUTPATIENT
Start: 2025-06-30 | End: 2025-06-30

## 2025-06-30 RX ADMIN — APIXABAN 2.5 MILLIGRAM(S): 2.5 TABLET, FILM COATED ORAL at 21:47

## 2025-06-30 RX ADMIN — ROSUVASTATIN CALCIUM 20 MILLIGRAM(S): 5 TABLET, FILM COATED ORAL at 21:49

## 2025-06-30 RX ADMIN — Medication 10 MILLIGRAM(S): at 06:46

## 2025-06-30 RX ADMIN — Medication 2 TABLET(S): at 21:49

## 2025-06-30 RX ADMIN — GABAPENTIN 100 MILLIGRAM(S): 400 CAPSULE ORAL at 12:50

## 2025-06-30 RX ADMIN — APIXABAN 2.5 MILLIGRAM(S): 2.5 TABLET, FILM COATED ORAL at 08:47

## 2025-06-30 RX ADMIN — Medication 25 GRAM(S): at 17:17

## 2025-06-30 RX ADMIN — SALINE 1 ENEMA: 7; 19 ENEMA RECTAL at 12:56

## 2025-06-30 RX ADMIN — Medication 25 GRAM(S): at 00:50

## 2025-06-30 RX ADMIN — Medication 725 MILLIGRAM(S): at 01:12

## 2025-06-30 RX ADMIN — Medication 290 MILLIGRAM(S): at 00:09

## 2025-06-30 RX ADMIN — Medication 25 GRAM(S): at 08:49

## 2025-06-30 RX ADMIN — MIRTAZAPINE 15 MILLIGRAM(S): 30 TABLET, FILM COATED ORAL at 21:47

## 2025-06-30 RX ADMIN — Medication 500 MILLILITER(S): at 01:57

## 2025-06-30 RX ADMIN — POLYETHYLENE GLYCOL 3350 17 GRAM(S): 17 POWDER, FOR SOLUTION ORAL at 05:47

## 2025-06-30 RX ADMIN — Medication 250 MILLIGRAM(S): at 17:17

## 2025-06-30 RX ADMIN — METOPROLOL SUCCINATE 5 MILLIGRAM(S): 50 TABLET, EXTENDED RELEASE ORAL at 00:30

## 2025-06-30 RX ADMIN — Medication 250 MILLIGRAM(S): at 05:47

## 2025-06-30 RX ADMIN — GABAPENTIN 100 MILLIGRAM(S): 400 CAPSULE ORAL at 21:48

## 2025-06-30 RX ADMIN — GABAPENTIN 100 MILLIGRAM(S): 400 CAPSULE ORAL at 05:47

## 2025-06-30 NOTE — PROGRESS NOTE ADULT - ASSESSMENT
89 female h/o cva, htn, chol, afib on eliquis, here with r/o cva     cva  neuro consult f/u  CT noted   MRI noted   tte noted  serial neuro exams  eliquis/statin  neuro f/u noted  PT/OT     uti  s/p ceftriaxone     afib  AC with eliquis  rate control  BB resumed   cards f/u    lower ext pain  f/u dopplers r/o dvt - neg  trial of gabapentin     new fever and hypotension  abx changed to zosyn  f/u repeat cult  f/u CT c/a/p - no acute pathology   ID consult f/u     constipation  bowel regimen     cont other home meds      PT    d/w daughter bedside     I reviewed the course of events on the unit, re-confirming the patient history.   I discussed the care with the patient and their family. The plan of care was discussed with the ACP team.  Advanced care planning was discussed with patient and family.  Advanced care planning forms were reviewed and discussed as appropriate.  Differential diagnosis and plan of care discussed with patient after the evaluation.   Pain assessed and judicious use of narcotics when appropriate was discussed.  Importance of Fall prevention discussed.  Counseling on Smoking and Alcohol cessation was offered when appropriate.  Counseling on Diet, exercise, and medication compliance was done.

## 2025-06-30 NOTE — PROVIDER CONTACT NOTE (OTHER) - REASON
Pt increased lethargy and R sided neck pain
Pt -140, tremors, c/o lower abd pain
Pt c/o pain in B/L calf. R>L
Pt temperature 100.6, HR >100
Pt c/o RLE nerve "shooting" pain
hypotension

## 2025-06-30 NOTE — CONSULT NOTE ADULT - ASSESSMENT
A/P    89 female h/o cva, htn, chol, afib on eliquis, here with r/o cva    #r/o CVA  -w/u per medicine   -neuro f/u    #AF  -episodes of RVR, off bb past few days   -restart metoprolol tartate 50mg bid   -echo with normal lv fxn  -cont a/c    #fever, hypotension  -infectious w/u pe\r med  -abx per med  -f/u bcx      78 minutes spent on total encounter; more than 50% of the visit was spent counseling and/or coordinating care by the attending physician.

## 2025-06-30 NOTE — CONSULT NOTE ADULT - SUBJECTIVE AND OBJECTIVE BOX
CARDIOLOGY CONSULT NOTE - DR. 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 Argueta), BIBEMS due to inability to speak or ambulate. LKW at around 3 AM on 6/24/2025. Daughter at bedside reports that she noted that patient was only making grunting sounds in response to conversation and appeared to have difficulty recognizing her daughter when she saw patient on 6/24/2025 at around 9 AM. However, daughter reports that patient has now returned to baseline. Of note, daughter stated that patient was reporting abdominal pain on 6/23/2025. Ambulates using a cane at baseline, requires assistance with eating and bathing. Reports adherence to Eliquis currently but reportedly had not been taking Eliquis for about 1 month for unknown reasons. Translation from Cantonese provided by daughter at bedside per patient and family preference.  (24 Jun 2025 18:58)        PAST MEDICAL & SURGICAL HISTORY:  Hypertension      Hyperlipidemia      CVA (cerebral vascular accident)      Afib      No significant past surgical history            PREVIOUS DIAGNOSTIC TESTING:    [ ] Echocardiogram:  [ ]  Catheterization:  [ ] Stress Test:  	    MEDICATIONS:    Home Medications:  Eliquis 2.5 mg oral tablet: 1 tab(s) orally 2 times a day (24 Jun 2025 18:25)  metoprolol succinate 100 mg oral tablet, extended release: 1 tab(s) orally once a day (24 Jun 2025 18:25)  mirtazapine 15 mg oral tablet: 1 tab(s) orally once a day (at bedtime) (24 Jun 2025 18:25)  Multiple Vitamins oral tablet: 1 tab(s) orally once a day (24 Jun 2025 18:58)  olmesartan 40 mg oral tablet: 1 tab(s) orally once a day (24 Jun 2025 18:25)  Potassium Chloride (Eqv-Klor-Con M20) 20 mEq oral tablet, extended release: 1 tab(s) orally once a day (with meals) (24 Jun 2025 18:33)  rosuvastatin 20 mg oral tablet: 1 tab(s) orally once a day (24 Jun 2025 18:25)  triamcinolone 0.1% topical lotion: Apply topically to affected area once a day as needed for  rash (24 Jun 2025 18:25)      MEDICATIONS  (STANDING):  acetaminophen     Tablet .. 650 milliGRAM(s) Oral once  apixaban 2.5 milliGRAM(s) Oral every 12 hours  gabapentin 100 milliGRAM(s) Oral three times a day  lactated ringers. 1000 milliLiter(s) (100 mL/Hr) IV Continuous <Continuous>  lidocaine   4% Patch 1 Patch Transdermal daily  mirtazapine 15 milliGRAM(s) Oral at bedtime  piperacillin/tazobactam IVPB.. 3.375 Gram(s) IV Intermittent every 8 hours  rosuvastatin 20 milliGRAM(s) Oral at bedtime  vancomycin  IVPB 750 milliGRAM(s) IV Intermittent every 12 hours      FAMILY HISTORY:  No pertinent family history in first degree relatives        SOCIAL HISTORY:    [x ] Non-smoker  [ ] Smoker  [ ] Alcohol    Allergies    shellfish (Unknown)  No Known Drug Allergies    Intolerances    	    REVIEW OF SYSTEMS:  CONSTITUTIONAL: No fever, weight loss, or fatigue  EYES: No eye pain, visual disturbances, or discharge  ENMT:  No difficulty hearing, tinnitus, vertigo; No sinus or throat pain  NECK: No pain or stiffness  RESPIRATORY: No cough, wheezing, chills or hemoptysis; No Shortness of Breath  CARDIOVASCULAR: as HPI  GASTROINTESTINAL: No abdominal or epigastric pain. No nausea, vomiting, or hematemesis; No diarrhea or constipation. No melena or hematochezia.  GENITOURINARY: No dysuria, frequency, hematuria, or incontinence  NEUROLOGICAL: No headaches, memory loss, loss of strength, numbness, or tremors  SKIN: No itching, burning, rashes, or lesions   	  [ ] All others negative	  [ ] Unable to obtain    PHYSICAL EXAM:    T(C): 37 (06-30-25 @ 11:03), Max: 37.8 (06-30-25 @ 00:03)  HR: 100 (06-30-25 @ 11:03) (64 - 124)  BP: 132/81 (06-30-25 @ 11:03) (95/58 - 161/89)  RR: 18 (06-30-25 @ 11:03) (18 - 18)  SpO2: 94% (06-30-25 @ 11:03) (89% - 98%)  Wt(kg): --  I&O's Summary    29 Jun 2025 07:01  -  30 Jun 2025 07:00  --------------------------------------------------------  IN: 3325 mL / OUT: 1350 mL / NET: 1975 mL      Daily     Daily     Appearance: Normal	  Psychiatry: A & O x 3, Mood & affect appropriate  HEENT:   Normal oral mucosa, PERRL, EOMI	  Lymphatic: No lymphadenopathy  Cardiovascular: Normal S1 S2,irreg  Respiratory: Lungs clear to auscultation	  Gastrointestinal:  Soft, Non-tender, + BS	  Skin: No rashes, No ecchymoses, No cyanosis	  Neurologic: Non-focal  Extremities: Normal range of motion, No clubbing, cyanosis or edema  Vascular: Peripheral pulses palpable 2+ bilaterally    TELEMETRY:  af 100's 	    ECG:  	  RADIOLOGY:  OTHER: 	  	  LABS:	 	    CARDIAC MARKERS:        proBNP:     Lipid Profile:   HgA1c:   TSH:                           13.2   14.24 )-----------( 173      ( 30 Jun 2025 06:20 )             41.0     06-30    135  |  101  |  16  ----------------------------<  125[H]  4.0   |  20[L]  |  0.84    Ca    8.1[L]      30 Jun 2025 06:17  Phos  2.8     06-30  Mg     1.6     06-30    TPro  7.2  /  Alb  3.6  /  TBili  1.2  /  DBili  x   /  AST  36  /  ALT  34  /  AlkPhos  64  06-29        Creatinine: 0.84 mg/dL (06-30-25 @ 06:17)  Creatinine: 0.70 mg/dL (06-29-25 @ 02:30)        ASSESSMENT/PLAN:

## 2025-06-30 NOTE — PROVIDER CONTACT NOTE (OTHER) - DATE AND TIME:
29-Jun-2025 23:57
26-Jun-2025 03:18
29-Jun-2025 00:21
29-Jun-2025 06:00
27-Jun-2025 20:57
29-Jun-2025 09:28

## 2025-06-30 NOTE — PROGRESS NOTE ADULT - ASSESSMENT
89-yo F w/ PMH of CVA (2015) w/ residual L vision loss, afib on Eliquis, and HTN, admitted for inability to speak or ambulate. Acute L MCA territory infarction noted. UA 6/24 unremarkable. UCx w/ >100k CFU/mL pansensitive E coli, s/p ceftriaxone 6/24-28. RRT on 6/29 for unresponsiveness, found to be hypotensive and febrile to 101. Started on vancomycin and Zosyn. Need for infectious workup. Aspiration should be included in differential.    Mental status greatly improved the next day. CT C/A/P w/ clear lungs and large stool load. Procal elevated compared to prior. Possibly aspiration pneumonitis?    #AMS  #CVA  #Fever  #Hypotension  #Debility  #At risk for aspiration    Recommendations:  - F/u BCx and UCx   - MRSA swab  - Continue with Zosyn 3.375 g IV Q8H and vancomycin  - If MRSA neg, d/c vanc  - Monitor WBC and VS  - F/u Neuro  - Bed head elevation. Aspiration precautions.    Topics relating to disease transmission risk assessment and mitigation, complex antimicrobial therapy counseling and treatment, and/or public health investigation, analysis, and testing were addressed.    Thank you for this consult. Inpatient ID team will follow.  Plan discussed with primary team clinician.  Discussed with daughter.      Gigi Miller MD, PhD  Attending Physician  [facility] of Infectious Diseases  Department of Medicine    Please contact through MS Teams message.  Office: [phone] (after 5 PM or weekend).

## 2025-06-30 NOTE — PROVIDER CONTACT NOTE (OTHER) - ACTION/TREATMENT ORDERED:
Kiko Garcia NP notified and to bedside. Stat blood cultures, covid/flu panel. Provider will order tylenol for fever. No further orders @ this time.
NP Cheyenne Voss notified. As per NP, tylenol 650 mg to ordered and administered. Will f/u with attending regarding dopplers. No further interventions at this time.
NS 250cc bolus given.
CHANDRA Duckworth made aware. Provider ot bedside. Provider to order gabapentin. no further orders @ this time.
Provider Honorio Jack MD notified. Rectal temp, EKG, CBC, BMP stat. Provider administered IV lopressor. Tx to tele. No further orders @ this time.
Kiko Garcia NP to beside. Pt responding to commands. Give lidocaine patch and IV tylenol. No further orders @ this time.

## 2025-06-30 NOTE — PROGRESS NOTE ADULT - SUBJECTIVE AND OBJECTIVE BOX
Follow Up:    AMS    Interval History/ROS:  Tmax 100 overnight. Patient was seen and examined at bedside. Daughter at bedside. Patient was awake and alert, calm. Per daughter, patient's mental status improved to baseline. No fever. No cough.    Allergies  shellfish (Unknown)  No Known Drug Allergies        ANTIMICROBIALS:  piperacillin/tazobactam IVPB.. 3.375 every 8 hours  vancomycin  IVPB 750 every 12 hours      OTHER MEDS:  MEDICATIONS  (STANDING):  acetaminophen     Tablet .. 650 once  acetaminophen     Tablet .. 650 every 6 hours PRN  apixaban 2.5 every 12 hours  gabapentin 100 three times a day  metoprolol tartrate 50 two times a day  mirtazapine 15 at bedtime  rosuvastatin 20 at bedtime      Vital Signs Last 24 Hrs  T(C): 37 (30 Jun 2025 11:03), Max: 37.8 (30 Jun 2025 00:03)  T(F): 98.6 (30 Jun 2025 11:03), Max: 100 (30 Jun 2025 00:03)  HR: 100 (30 Jun 2025 11:03) (70 - 124)  BP: 132/81 (30 Jun 2025 11:03) (101/62 - 161/89)  BP(mean): --  RR: 18 (30 Jun 2025 11:03) (18 - 18)  SpO2: 94% (30 Jun 2025 11:03) (89% - 98%)    Parameters below as of 30 Jun 2025 11:03  Patient On (Oxygen Delivery Method): nasal cannula  O2 Flow (L/min): 2      PHYSICAL EXAM:  Constitutional: non-toxic, no distress  ENT:  supple, no thrush  Cardiovascular:   normal S1, S2, no murmur, RRR  Respiratory:  clear BS bilaterally, no wheezes, no rales  GI:  soft  Neurologic: awake and alert  Skin:  no rash, no phlebitis  Psychiatric:  awake, alert, appropriate mood                                13.2   14.24 )-----------( 173      ( 30 Jun 2025 06:20 )             41.0       06-30    135  |  101  |  16  ----------------------------<  125[H]  4.0   |  20[L]  |  0.84    Ca    8.1[L]      30 Jun 2025 06:17  Phos  2.8     06-30  Mg     1.6     06-30    TPro  7.2  /  Alb  3.6  /  TBili  1.2  /  DBili  x   /  AST  36  /  ALT  34  /  AlkPhos  64  06-29    Procalcitonin (06.30.25 @ 06:17)   Procalcitonin: 0.42  Procalcitonin (06.29.25 @ 01:41)   Procalcitonin: 0.14      Urinalysis Basic - ( 30 Jun 2025 06:17 )    Color: x / Appearance: x / SG: x / pH: x  Gluc: 125 mg/dL / Ketone: x  / Bili: x / Urobili: x   Blood: x / Protein: x / Nitrite: x   Leuk Esterase: x / RBC: x / WBC x   Sq Epi: x / Non Sq Epi: x / Bacteria: x        MICROBIOLOGY:  v  Clean Catch Clean Catch (Midstream)  06-29-25   <10,000 CFU/mL Normal Urogenital Nahed  --  --      Blood Blood  06-29-25   No growth at 24 hours  --  --      Blood Blood  06-29-25   No growth at 24 hours  --  --      Clean Catch  06-24-25   >100,000 CFU/ml Escherichia coli  --  Escherichia coli                RADIOLOGY:  Imaging below independently reviewed.  < from: CT Chest w/ IV Cont (06.29.25 @ 22:59) >    ACC: [id number] EXAM:  CT ABDOMEN AND PELVIS OC IC   ORDERED BY:  CARLITOS URENA     ACC: [id number] EXAM:  CT CHEST IC   ORDERED BY:  CARLITOS URENA     PROCEDURE DATE:  06/29/2025          INTERPRETATION:  CLINICAL INFORMATION: Nonverbal. Evaluate for   constipation per team.    COMPARISON: None.    CONTRAST/COMPLICATIONS:  IV Contrast: Omnipaque 350  90 cc administered   10 cc discarded  Oral Contrast: Omnipaque 300    PROCEDURE:  CT of the Chest, Abdomen and Pelvis was performed.  Sagittal and coronal reformats were performed.    FINDINGS:  CHEST:  LUNGS AND LARGE AIRWAYS: Patent central airways. Passive atelectasis at   the lung bases.  PLEURA: Trace bilateral pleural effusions.  VESSELS: Atherosclerotic calcifications.  HEART: Cardiomegaly. No pericardial effusion.  MEDIASTINUM AND MARIANNE: No lymphadenopathy.  CHEST WALL AND LOWER NECK: Left thyroidectomy.    ABDOMEN AND PELVIS:  LIVER: Indeterminate left hepatic lobe lesion with small peripheral   calcification measuring 1.4 cm. Subcentimeter lesions too small to   characterize.  BILE DUCTS: Normal caliber.  GALLBLADDER: Within normal limits.  SPLEEN: Within normal limits.  PANCREAS: Within normal limits.  ADRENALS: Within normal limits.  KIDNEYS/URETERS: Right renal cyst measuring 3.4 cm. Additional lesions   too small to characterize.    BLADDER: Within normal limits.  REPRODUCTIVE ORGANS: This bilateral adnexa within normal limits.    BOWEL: No bowel obstruction. Appendix is normal. Diverticulosis, without   evidence of acute diverticulitis. Prominent distention of the rectum with   stool.  PERITONEUM/RETROPERITONEUM: No pneumoperitoneum or drainable fluid   collection.  VESSELS: Atherosclerotic calcifications.  LYMPH NODES: No lymphadenopathy.  ABDOMINAL WALL: Mild presacral edema.  BONES: Within normal limits.    IMPRESSION:  Prominent distention of the rectum with stool, in keeping with the   clinical history of constipation.    Trace bilateral pleural effusions with adjacent passive atelectasis.   Otherwiseclear lungs.  .    --- End of Report ---          CHING VALVERDE MD; Resident Radiologist  This document has been electronically signed.  JESSICA BERRIOS MD; Attending Radiologist  This document has been electronically signed. Jun 30 2025  7:31AM    < end of copied text >

## 2025-06-30 NOTE — PROGRESS NOTE ADULT - SUBJECTIVE AND OBJECTIVE BOX
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 Argueta), BIBEMS due to inability to speak or ambulate. LKW at around 3 AM on 6/24/2025. Daughter at bedside reports that she noted that patient was only making grunting sounds in response to conversation and appeared to have difficulty recognizing her daughter when she saw patient on 6/24/2025 at around 9 AM. However, daughter reports that patient has now returned to baseline. Of note, daughter stated that patient was reporting abdominal pain on 6/23/2025. Ambulates using a cane at baseline, requires assistance with eating and bathing. Reports adherence to Eliquis currently but reportedly had not been taking Eliquis for about 1 month for unknown reasons. Translation from Cantonese provided by daughter at bedside per patient and family preference.  (24 Jun 2025 18:58)      Patient seen and evaluated at bedside. No acute events overnight except as noted.    Interval HPI: tx to tele for afib rvr     PAST MEDICAL & SURGICAL HISTORY:  Hypertension      Hyperlipidemia      CVA (cerebral vascular accident)      Afib      No significant past surgical history          REVIEW OF SYSTEMS:  as per hpi       VITALS:    Vital Signs Last 24 Hrs  T(C): 37 (30 Jun 2025 11:03), Max: 37.8 (30 Jun 2025 00:03)  T(F): 98.6 (30 Jun 2025 11:03), Max: 100 (30 Jun 2025 00:03)  HR: 100 (30 Jun 2025 11:03) (64 - 124)  BP: 132/81 (30 Jun 2025 11:03) (98/64 - 161/89)  BP(mean): --  RR: 18 (30 Jun 2025 11:03) (18 - 18)  SpO2: 94% (30 Jun 2025 11:03) (89% - 98%)    Parameters below as of 30 Jun 2025 11:03  Patient On (Oxygen Delivery Method): nasal cannula  O2 Flow (L/min): 2        PHYSICAL EXAM:  GENERAL: NAD, well-developed, lethargic   HEAD:  Atraumatic, Normocephalic  EYES: EOMI, PERRLA, conjunctiva and sclera clear  NECK: Supple, No JVD  CHEST/LUNG: Clear to auscultation bilaterally; No wheeze  HEART: S1, S2; No murmurs, rubs, or gallops  ABDOMEN: Soft, Nontender, Nondistended; Bowel sounds present  EXTREMITIES:  2+ Peripheral Pulses, No clubbing, cyanosis, or edema  NEUROLOGY: AAOX3; +dysarthria   SKIN: No rashes or lesions    Consultant(s) Notes Reviewed:  [x ] YES  [ ] NO  Care Discussed with Consultants/Other Providers [ x] YES  [ ] NO    MEDS:   MEDICATIONS  (STANDING):  acetaminophen     Tablet .. 650 milliGRAM(s) Oral once  apixaban 2.5 milliGRAM(s) Oral every 12 hours  gabapentin 100 milliGRAM(s) Oral three times a day  lactated ringers. 1000 milliLiter(s) (100 mL/Hr) IV Continuous <Continuous>  lidocaine   4% Patch 1 Patch Transdermal daily  mirtazapine 15 milliGRAM(s) Oral at bedtime  piperacillin/tazobactam IVPB.. 3.375 Gram(s) IV Intermittent every 8 hours  rosuvastatin 20 milliGRAM(s) Oral at bedtime  vancomycin  IVPB 750 milliGRAM(s) IV Intermittent every 12 hours    MEDICATIONS  (PRN):  acetaminophen     Tablet .. 650 milliGRAM(s) Oral every 6 hours PRN Moderate Pain (4 - 6)      ALLERGIES:  shellfish (Unknown)  No Known Drug Allergies      LABS:                                         13.2   14.24 )-----------( 173      ( 30 Jun 2025 06:20 )             41.0   06-30    135  |  101  |  16  ----------------------------<  125[H]  4.0   |  20[L]  |  0.84    Ca    8.1[L]      30 Jun 2025 06:17  Phos  2.8     06-30  Mg     1.6     06-30    TPro  7.2  /  Alb  3.6  /  TBili  1.2  /  DBili  x   /  AST  36  /  ALT  34  /  AlkPhos  64  06-29    < from: CT Chest w/ IV Cont (06.29.25 @ 22:59) >  IMPRESSION:  Prominent distention of the rectum with stool, in keeping with the   clinical history of constipation.    Trace bilateral pleural effusions with adjacent passive atelectasis.   Otherwiseclear lungs.  .    --- End of Report ---    < end of copied text >        < from: CT Head No Cont (06.29.25 @ 13:35) >    IMPRESSION:  Small acute to subacute infarct in the left frontal cortex seen better on   MRI 6/25/2025. No hemorrhage.  Chronic bilateral occipital infarcts and a right thalamic lacunar infarct   is again noted.      < end of copied text >                 < from: VA Duplex Lower Ext Vein Scan, Bilat (06.27.25 @ 18:09) >  IMPRESSION:  No evidence of deep venous thrombosis in either lower extremity.        < end of copied text >                                   < from: TTE Limited W or WO Ultrasound Enhancing Agent (06.26.25 @ 14:40) >  _______________________________________________________________________________________     CONCLUSIONS:      1. Left ventricular cavity is small. Left ventricular wall thickness is normal. Left ventricular systolic function is normal with an ejection fraction of 57 % by Denson's method of disks. There are no regional wall motion abnormalities seen.   2. Normal right ventricular cavity size, with normal wall thickness, and normal right ventricular systolic function. Tricuspid annular plane systolic excursion (TAPSE) is 2.0 cm (normal >=1.7 cm).   3. Left atrium is mildly dilated.   4. The right atrium is mildly dilated.   5. No pericardial effusion seen.   6. No prior echocardiogram is available for comparison.   7. Agitated saline injection was negative for intracardiac shunt.   8. Severe tricuspid regurgitation.   9. There is normal LV mass and concentric remodeling.    ________________________________________________________________________________________    < end of copied text >      < from: MR Head No Cont (06.25.25 @ 20:50) >    IMPRESSION: Acute left MCA territory infarctions    --- End of Report ---        < end of copied text >           < from: CT Angio Neck Stroke Protocol w/ IV Cont (06.24.25 @ 10:18) >  IMPRESSION:    CT PERFUSION: No perfusion deficit.    CTA NECK AND HEAD: No large vessel occlusion or major stenosis.    --- End of Report ---      < end of copied text >

## 2025-06-30 NOTE — PROVIDER CONTACT NOTE (OTHER) - RECOMMENDATIONS
Notify provider.
Notify provider.
KIMBERLY Voss made aware.
Kayla Rangel (KIMBERLY) at the bedside.
Notify provider. EKG?
Notify provider. CBC &BMP? Tylenol?

## 2025-06-30 NOTE — PROVIDER CONTACT NOTE (OTHER) - NAME OF MD/NP/PA/DO NOTIFIED:
Honorio Jack MD
KIMBERLY Voss
Kayla Rangel (NP) made aware
Kiko Garcia NP
Doug Duckworth
Kiko Garcia NP

## 2025-06-30 NOTE — PROVIDER CONTACT NOTE (OTHER) - SITUATION
pt was hypotensive
Pt temperature 100.6, HR >100
Pt c/o RLE nerve "shooting" pain
Pt increased lethargy and R sided neck pain
Pt c/o pain in B/L calf. R>L
Pt -140, tremors, c/o lower abd pain

## 2025-06-30 NOTE — PROVIDER CONTACT NOTE (OTHER) - ASSESSMENT
Pt A&Ox2-3, Cantonese speaking, with increase lethargy and low BP (current BP 86/52; HR 92.  As per daughter pt has increased lethargy from yesterday. Pt was difficult to arouse and had difficulty swallowing pills. No c/o chest pain, SOB, or dizziness. No acute change in neuro assessment.
Pt A&Ox2-3, Cantonese speaking. Increased temperature and HR overnight, team already aware, VS otherwise stable,. Current /71, RI 96, 98.9 temp. Pt daughter translating for pt. Pt has increased lethargy from previous assessments. Daughter expressed she is "very tired". Pt was difficult to arouse and had difficulty swallowing applesauce and pills. Pt c/o R sides neck pain starting "yesterday", she is "unable to move her neck". RN noted pt is leaning to the left. Pt c/o pain when repositioned with head straight. No c/o chest pain, SOB, or dizziness. No acute change in neuro assessment.
Pt A&Ox3-4, confused @ times, slurred speech, -140s on CPOX, /89, oral temp 98.7, rectal temp 100F, 89% on RA. Pt c/o lower abd pain. RN noted tremors. Pt able to preform neuro check commands, no acute change. Small hard stool noted in rectum, bright red blood noted in rectum( prior to and unrelated to rectal temp ).  No c/o chest pain, dizziness or headache.
Pt A&Ox2-3 , Cantonese speaking, family @ bedside to translate. Pt c/o RLE nerve "shooting" pain. Pt family expressed concern for intermittent pain starting today. Neurological checks unchanged, pt still showing R sided deficit. No c/o chest pain, SOB or dizziness @ this time.
Pt A&Ox4, Pt temperature 100.6, HR >100, VS otherwise stable. No acute changes in Neurological checks. No c/o pain @ this time. Pt encouraged to use incentive spirometer. Pt has not has BMP or CBC drawn since 6/25. Famly is concerned for patients increased temperature.  No c/o chest pain, chills, SOB, dizziness or headache @ this time.
Pt A&XO2, Pt c/o pain in B/L calf. R>L, tender to touch, Denies CP/SOB/Headache.

## 2025-06-30 NOTE — CHART NOTE - NSCHARTNOTEFT_GEN_A_CORE
MEDICINE NP    Notified by RN patient with Afib with RVR. Seen and examined patient at bedside. Patient is alert, NAD. patient heart rate fluctuating 120's to 130's. EKG shows afib with RVR. Patient transferred to unit where she cna be monitored continuously on tele. Patient treated with treatment plan below.    VITAL SIGNS:  T(C): 37.3 (06-30-25 @ 01:43), Max: 37.8 (06-29-25 @ 09:21)  HR: 124 (06-30-25 @ 01:43) (64 - 124)  BP: 112/66 (06-30-25 @ 01:43) (73/46 - 161/89)  RR: 18 (06-30-25 @ 01:43) (18 - 18)  SpO2: 96% (06-30-25 @ 01:43) (89% - 96%)  Wt(kg): --      LABORATORY:                          13.5   10.18 )-----------( 202      ( 30 Jun 2025 00:36 )             42.1       06-29    135  |  98  |  12  ----------------------------<  134[H]  3.9   |  23  |  0.70    Ca    8.9      29 Jun 2025 02:30  Phos  3.1     06-30  Mg     1.7     06-30    TPro  7.2  /  Alb  3.6  /  TBili  1.2  /  DBili  x   /  AST  36  /  ALT  34  /  AlkPhos  64  06-29          MICROBIOLOGY:           RADIOLOGY:          PHYSICAL EXAM:    Constitutional: AOx2. NAD.    Respiratory: clear lungs bilaterally. No wheezing, rhonchi, or crackles.    Cardiovascular: S1 S2. No murmurs.    Gastrointestinal: BS X4 active. soft. nontender.    Extremities/Vascular: +2 pulses bilaterally. No BLE edema.      ASSESSMENT/PLAN:   HPI:   88 yo female, with PMHx of HTN, HLD, prior stroke in 2015 with residual left eye vision loss (bilateral occipital lobes and right posteromedial temporal lobe, right thalamic lacunar, right basal ganglia, right paramedian midbrain stroke), Afib (on Eliquis), cognitive decline (follows with Dr. Argueta), BIBEMS due to inability to speak or ambulate. LKW at around 3 AM on 6/24/2025. Daughter at bedside reports that she noted that patient was only making grunting sounds in response to conversation and appeared to have difficulty recognizing her daughter when she saw patient on 6/24/2025 at around 9 AM. However, daughter reports that patient has now returned to baseline. Of note, daughter stated that patient was reporting abdominal pain on 6/23/2025. Ambulates using a cane at baseline, requires assistance with eating and bathing. Reports adherence to Eliquis currently but reportedly had not been taking Eliquis for about 1 month for unknown reasons. Translation from Cantonese provided by daughter at bedside per patient and family preference.  Afib with RVR        1) Afib with RVR  -Ekg  -labs: cmp, mag, phos  -lopressor 5mg  -Transfer to tele floor  -c/w tele monitoring  -F/U primary team in AM

## 2025-07-01 LAB
ANION GAP SERPL CALC-SCNC: 13 MMOL/L — SIGNIFICANT CHANGE UP (ref 5–17)
BUN SERPL-MCNC: 10 MG/DL — SIGNIFICANT CHANGE UP (ref 7–23)
CALCIUM SERPL-MCNC: 7.9 MG/DL — LOW (ref 8.4–10.5)
CHLORIDE SERPL-SCNC: 100 MMOL/L — SIGNIFICANT CHANGE UP (ref 96–108)
CO2 SERPL-SCNC: 24 MMOL/L — SIGNIFICANT CHANGE UP (ref 22–31)
CREAT SERPL-MCNC: 0.79 MG/DL — SIGNIFICANT CHANGE UP (ref 0.5–1.3)
EGFR: 71 ML/MIN/1.73M2 — SIGNIFICANT CHANGE UP
EGFR: 71 ML/MIN/1.73M2 — SIGNIFICANT CHANGE UP
GLUCOSE SERPL-MCNC: 137 MG/DL — HIGH (ref 70–99)
HCT VFR BLD CALC: 33.6 % — LOW (ref 34.5–45)
HGB BLD-MCNC: 11.1 G/DL — LOW (ref 11.5–15.5)
LACTATE SERPL-SCNC: 1.1 MMOL/L — SIGNIFICANT CHANGE UP (ref 0.5–2)
MCHC RBC-ENTMCNC: 30.8 PG — SIGNIFICANT CHANGE UP (ref 27–34)
MCHC RBC-ENTMCNC: 33 G/DL — SIGNIFICANT CHANGE UP (ref 32–36)
MCV RBC AUTO: 93.3 FL — SIGNIFICANT CHANGE UP (ref 80–100)
MRSA PCR RESULT.: SIGNIFICANT CHANGE UP
NRBC # BLD AUTO: 0 K/UL — SIGNIFICANT CHANGE UP (ref 0–0)
NRBC # FLD: 0 K/UL — SIGNIFICANT CHANGE UP (ref 0–0)
NRBC BLD AUTO-RTO: 0 /100 WBCS — SIGNIFICANT CHANGE UP (ref 0–0)
PLATELET # BLD AUTO: 193 K/UL — SIGNIFICANT CHANGE UP (ref 150–400)
PMV BLD: 10.8 FL — SIGNIFICANT CHANGE UP (ref 7–13)
POTASSIUM SERPL-MCNC: 3.6 MMOL/L — SIGNIFICANT CHANGE UP (ref 3.5–5.3)
POTASSIUM SERPL-SCNC: 3.6 MMOL/L — SIGNIFICANT CHANGE UP (ref 3.5–5.3)
RBC # BLD: 3.6 M/UL — LOW (ref 3.8–5.2)
RBC # FLD: 12.2 % — SIGNIFICANT CHANGE UP (ref 10.3–14.5)
S AUREUS DNA NOSE QL NAA+PROBE: SIGNIFICANT CHANGE UP
SODIUM SERPL-SCNC: 137 MMOL/L — SIGNIFICANT CHANGE UP (ref 135–145)
VANCOMYCIN TROUGH SERPL-MCNC: 8.7 UG/ML — LOW (ref 10–20)
WBC # BLD: 11.39 K/UL — HIGH (ref 3.8–10.5)
WBC # FLD AUTO: 11.39 K/UL — HIGH (ref 3.8–10.5)

## 2025-07-01 PROCEDURE — 99232 SBSQ HOSP IP/OBS MODERATE 35: CPT

## 2025-07-01 PROCEDURE — G0545: CPT

## 2025-07-01 RX ADMIN — APIXABAN 2.5 MILLIGRAM(S): 2.5 TABLET, FILM COATED ORAL at 21:30

## 2025-07-01 RX ADMIN — GABAPENTIN 100 MILLIGRAM(S): 400 CAPSULE ORAL at 21:33

## 2025-07-01 RX ADMIN — Medication 250 MILLIGRAM(S): at 17:10

## 2025-07-01 RX ADMIN — APIXABAN 2.5 MILLIGRAM(S): 2.5 TABLET, FILM COATED ORAL at 08:06

## 2025-07-01 RX ADMIN — ROSUVASTATIN CALCIUM 20 MILLIGRAM(S): 5 TABLET, FILM COATED ORAL at 21:29

## 2025-07-01 RX ADMIN — GABAPENTIN 100 MILLIGRAM(S): 400 CAPSULE ORAL at 06:56

## 2025-07-01 RX ADMIN — METOPROLOL SUCCINATE 50 MILLIGRAM(S): 50 TABLET, EXTENDED RELEASE ORAL at 17:13

## 2025-07-01 RX ADMIN — MIRTAZAPINE 15 MILLIGRAM(S): 30 TABLET, FILM COATED ORAL at 21:30

## 2025-07-01 RX ADMIN — Medication 25 GRAM(S): at 08:06

## 2025-07-01 RX ADMIN — Medication 25 GRAM(S): at 17:10

## 2025-07-01 RX ADMIN — Medication 25 GRAM(S): at 01:07

## 2025-07-01 RX ADMIN — GABAPENTIN 100 MILLIGRAM(S): 400 CAPSULE ORAL at 13:23

## 2025-07-01 RX ADMIN — Medication 2 TABLET(S): at 21:29

## 2025-07-01 RX ADMIN — METOPROLOL SUCCINATE 50 MILLIGRAM(S): 50 TABLET, EXTENDED RELEASE ORAL at 06:56

## 2025-07-01 RX ADMIN — Medication 250 MILLIGRAM(S): at 06:57

## 2025-07-01 NOTE — PROGRESS NOTE ADULT - SUBJECTIVE AND OBJECTIVE BOX
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 Argueta), BIBEMS due to inability to speak or ambulate. LKW at around 3 AM on 6/24/2025. Daughter at bedside reports that she noted that patient was only making grunting sounds in response to conversation and appeared to have difficulty recognizing her daughter when she saw patient on 6/24/2025 at around 9 AM. However, daughter reports that patient has now returned to baseline. Of note, daughter stated that patient was reporting abdominal pain on 6/23/2025. Ambulates using a cane at baseline, requires assistance with eating and bathing. Reports adherence to Eliquis currently but reportedly had not been taking Eliquis for about 1 month for unknown reasons. Translation from Cantonese provided by daughter at bedside per patient and family preference.  (24 Jun 2025 18:58)      Patient seen and evaluated at bedside. No acute events overnight except as noted.    Interval HPI: no acute events o/n    PAST MEDICAL & SURGICAL HISTORY:  Hypertension      Hyperlipidemia      CVA (cerebral vascular accident)      Afib      No significant past surgical history          REVIEW OF SYSTEMS:  as per hpi       VITALS:    Vital Signs Last 24 Hrs  T(C): 36.8 (01 Jul 2025 12:11), Max: 36.8 (01 Jul 2025 12:11)  T(F): 98.2 (01 Jul 2025 12:11), Max: 98.2 (01 Jul 2025 12:11)  HR: 79 (01 Jul 2025 12:11) (79 - 106)  BP: 92/56 (01 Jul 2025 12:11) (92/56 - 115/72)  BP(mean): --  RR: 18 (01 Jul 2025 12:11) (18 - 18)  SpO2: 98% (01 Jul 2025 12:11) (96% - 98%)    Parameters below as of 01 Jul 2025 12:11  Patient On (Oxygen Delivery Method): room air          PHYSICAL EXAM:  GENERAL: NAD, well-developed, lethargic   HEAD:  Atraumatic, Normocephalic  EYES: EOMI, PERRLA, conjunctiva and sclera clear  NECK: Supple, No JVD  CHEST/LUNG: Clear to auscultation bilaterally; No wheeze  HEART: S1, S2; No murmurs, rubs, or gallops  ABDOMEN: Soft, Nontender, Nondistended; Bowel sounds present  EXTREMITIES:  2+ Peripheral Pulses, No clubbing, cyanosis, or edema  NEUROLOGY: AAOX3; +dysarthria   SKIN: No rashes or lesions    Consultant(s) Notes Reviewed:  [x ] YES  [ ] NO  Care Discussed with Consultants/Other Providers [ x] YES  [ ] NO    MEDS:   MEDICATIONS  (STANDING):  acetaminophen     Tablet .. 650 milliGRAM(s) Oral once  apixaban 2.5 milliGRAM(s) Oral every 12 hours  gabapentin 100 milliGRAM(s) Oral three times a day  lactated ringers. 1000 milliLiter(s) (100 mL/Hr) IV Continuous <Continuous>  lidocaine   4% Patch 1 Patch Transdermal daily  metoprolol tartrate 50 milliGRAM(s) Oral two times a day  mirtazapine 15 milliGRAM(s) Oral at bedtime  piperacillin/tazobactam IVPB.. 3.375 Gram(s) IV Intermittent every 8 hours  polyethylene glycol 3350 17 Gram(s) Oral daily  rosuvastatin 20 milliGRAM(s) Oral at bedtime  senna 2 Tablet(s) Oral at bedtime  vancomycin  IVPB 750 milliGRAM(s) IV Intermittent every 12 hours    MEDICATIONS  (PRN):  acetaminophen     Tablet .. 650 milliGRAM(s) Oral every 6 hours PRN Moderate Pain (4 - 6)      ALLERGIES:  shellfish (Unknown)  No Known Drug Allergies      LABS:                                              11.1   11.39 )-----------( 193      ( 01 Jul 2025 05:51 )             33.6   07-01    137  |  100  |  10  ----------------------------<  137[H]  3.6   |  24  |  0.79    Ca    7.9[L]      01 Jul 2025 05:53  Phos  2.8     06-30  Mg     1.6     06-30        < from: CT Chest w/ IV Cont (06.29.25 @ 22:59) >  IMPRESSION:  Prominent distention of the rectum with stool, in keeping with the   clinical history of constipation.    Trace bilateral pleural effusions with adjacent passive atelectasis.   Otherwiseclear lungs.  .    --- End of Report ---    < end of copied text >        < from: CT Head No Cont (06.29.25 @ 13:35) >    IMPRESSION:  Small acute to subacute infarct in the left frontal cortex seen better on   MRI 6/25/2025. No hemorrhage.  Chronic bilateral occipital infarcts and a right thalamic lacunar infarct   is again noted.      < end of copied text >                 < from: VA Duplex Lower Ext Vein Scan, Bilat (06.27.25 @ 18:09) >  IMPRESSION:  No evidence of deep venous thrombosis in either lower extremity.        < end of copied text >                                   < from: TTE Limited W or WO Ultrasound Enhancing Agent (06.26.25 @ 14:40) >  _______________________________________________________________________________________     CONCLUSIONS:      1. Left ventricular cavity is small. Left ventricular wall thickness is normal. Left ventricular systolic function is normal with an ejection fraction of 57 % by Denson's method of disks. There are no regional wall motion abnormalities seen.   2. Normal right ventricular cavity size, with normal wall thickness, and normal right ventricular systolic function. Tricuspid annular plane systolic excursion (TAPSE) is 2.0 cm (normal >=1.7 cm).   3. Left atrium is mildly dilated.   4. The right atrium is mildly dilated.   5. No pericardial effusion seen.   6. No prior echocardiogram is available for comparison.   7. Agitated saline injection was negative for intracardiac shunt.   8. Severe tricuspid regurgitation.   9. There is normal LV mass and concentric remodeling.    ________________________________________________________________________________________    < end of copied text >      < from: MR Head No Cont (06.25.25 @ 20:50) >    IMPRESSION: Acute left MCA territory infarctions    --- End of Report ---        < end of copied text >           < from: CT Angio Neck Stroke Protocol w/ IV Cont (06.24.25 @ 10:18) >  IMPRESSION:    CT PERFUSION: No perfusion deficit.    CTA NECK AND HEAD: No large vessel occlusion or major stenosis.    --- End of Report ---      < end of copied text >

## 2025-07-01 NOTE — PROGRESS NOTE ADULT - ASSESSMENT
A/P    89 female h/o cva, htn, chol, afib on eliquis, here with r/o cva    #r/o CVA  -w/u per medicine   -neuro f/u    #AF  -episodes of RVR, off bb past few days   -cont metoprolol tartate 50mg bid   -echo with normal lv fxn  -cont a/c    #fever, hypotension  -infectious w/u pe\r med  -abx per med  -f/u bcx      58 minutes spent on total encounter; more than 50% of the visit was spent counseling and/or coordinating care by the attending physician.

## 2025-07-01 NOTE — PROGRESS NOTE ADULT - SUBJECTIVE AND OBJECTIVE BOX
Follow Up:    AMS    Interval History/ROS:  VSS. Patient was seen and examined at bedside. Awake, calm and pleasant. No fever or cough. +PO. Daughter and  at bedside.     Allergies  shellfish (Unknown)  No Known Drug Allergies        ANTIMICROBIALS:  piperacillin/tazobactam IVPB.. 3.375 every 8 hours  vancomycin  IVPB 750 every 12 hours      OTHER MEDS:  MEDICATIONS  (STANDING):  acetaminophen     Tablet .. 650 once  acetaminophen     Tablet .. 650 every 6 hours PRN  apixaban 2.5 every 12 hours  gabapentin 100 three times a day  metoprolol tartrate 50 two times a day  mirtazapine 15 at bedtime  polyethylene glycol 3350 17 daily  rosuvastatin 20 at bedtime  senna 2 at bedtime      Vital Signs Last 24 Hrs  T(C): 36.8 (01 Jul 2025 12:11), Max: 36.8 (01 Jul 2025 12:11)  T(F): 98.2 (01 Jul 2025 12:11), Max: 98.2 (01 Jul 2025 12:11)  HR: 79 (01 Jul 2025 12:11) (79 - 106)  BP: 92/56 (01 Jul 2025 12:11) (92/56 - 115/72)  BP(mean): --  RR: 18 (01 Jul 2025 12:11) (18 - 18)  SpO2: 98% (01 Jul 2025 12:11) (96% - 98%)    Parameters below as of 01 Jul 2025 12:11  Patient On (Oxygen Delivery Method): room air      PHYSICAL EXAM:  Constitutional: non-toxic, no distress  ENT:  supple, no thrush  Cardiovascular:   normal S1, S2, no murmur, RRR  Respiratory:  clear BS bilaterally, no wheezes, no rales  GI:  soft  Neurologic: awake and alert  Skin:  no rash, no phlebitis  Psychiatric:  awake, alert, appropriate mood                          11.1   11.39 )-----------( 193      ( 01 Jul 2025 05:51 )             33.6       07-01    137  |  100  |  10  ----------------------------<  137[H]  3.6   |  24  |  0.79    Ca    7.9[L]      01 Jul 2025 05:53  Phos  2.8     06-30  Mg     1.6     06-30        Urinalysis Basic - ( 01 Jul 2025 05:53 )    Color: x / Appearance: x / SG: x / pH: x  Gluc: 137 mg/dL / Ketone: x  / Bili: x / Urobili: x   Blood: x / Protein: x / Nitrite: x   Leuk Esterase: x / RBC: x / WBC x   Sq Epi: x / Non Sq Epi: x / Bacteria: x        MICROBIOLOGY:  Vancomycin Level, Trough: 8.7 ug/mL (07-01-25 @ 07:40)  v  Clean Catch Clean Catch (Midstream)  06-29-25   <10,000 CFU/mL Normal Urogenital Nahed  --  --      Blood Blood  06-29-25   No growth at 48 Hours  --  --      Blood Blood  06-29-25   No growth at 48 Hours  --  --      Clean Catch  06-24-25   >100,000 CFU/ml Escherichia coli  --  Escherichia coli                RADIOLOGY:  Imaging below independently reviewed.  < from: CT Chest w/ IV Cont (06.29.25 @ 22:59) >    ACC: [id number] EXAM:  CT ABDOMEN AND PELVIS OC IC   ORDERED BY:  CARLITOS URENA     ACC: [id number] EXAM:  CT CHEST IC   ORDERED BY:  CARLITOS URENA     PROCEDURE DATE:  06/29/2025          INTERPRETATION:  CLINICAL INFORMATION: Nonverbal. Evaluate for   constipation per team.    COMPARISON: None.    CONTRAST/COMPLICATIONS:  IV Contrast: Omnipaque 350  90 cc administered   10 cc discarded  Oral Contrast: Omnipaque 300    PROCEDURE:  CT of the Chest, Abdomen and Pelvis was performed.  Sagittal and coronal reformats were performed.    FINDINGS:  CHEST:  LUNGS AND LARGE AIRWAYS: Patent central airways. Passive atelectasis at   the lung bases.  PLEURA: Trace bilateral pleural effusions.  VESSELS: Atherosclerotic calcifications.  HEART: Cardiomegaly. No pericardial effusion.  MEDIASTINUM AND MARIANNE: No lymphadenopathy.  CHEST WALL AND LOWER NECK: Left thyroidectomy.    ABDOMEN AND PELVIS:  LIVER: Indeterminate left hepatic lobe lesion with small peripheral   calcification measuring 1.4 cm. Subcentimeter lesions too small to   characterize.  BILE DUCTS: Normal caliber.  GALLBLADDER: Within normal limits.  SPLEEN: Within normal limits.  PANCREAS: Within normal limits.  ADRENALS: Within normal limits.  KIDNEYS/URETERS: Right renal cyst measuring 3.4 cm. Additional lesions   too small to characterize.    BLADDER: Within normal limits.  REPRODUCTIVE ORGANS: This bilateral adnexa within normal limits.    BOWEL: No bowel obstruction. Appendix is normal. Diverticulosis, without   evidence of acute diverticulitis. Prominent distention of the rectum with   stool.  PERITONEUM/RETROPERITONEUM: No pneumoperitoneum or drainable fluid   collection.  VESSELS: Atherosclerotic calcifications.  LYMPH NODES: No lymphadenopathy.  ABDOMINAL WALL: Mild presacral edema.  BONES: Within normal limits.    IMPRESSION:  Prominent distention of the rectum with stool, in keeping with the   clinical history of constipation.    Trace bilateral pleural effusions with adjacent passive atelectasis.   Otherwiseclear lungs.  .    --- End of Report ---          CHING VALVERDE MD; Resident Radiologist  This document has been electronically signed.  JESSICA BERRIOS MD; Attending Radiologist  This document has been electronically signed. Jun 30 2025  7:31AM    < end of copied text >

## 2025-07-01 NOTE — CONSULT NOTE ADULT - ASSESSMENT
89 yo female, with  HTN, HLD, prior stroke in 2015 with residual left eye vision loss (bilateral occipital lobes and right posteromedial temporal lobe, right thalamic lacunar, right basal ganglia, right paramedian midbrain stroke), Afib (on Eliquis), cognitive decline (follows with Dr. Argueta), BIBEMS due to inability to speak or ambulate. Reports adherence to Eliquis currently but reportedly had not been taking Eliquis for about 1 month for unknown reasons.    On exam, entire R side notably weaker than L, RUE>RLE, + RUE drift. Pending MRI brain to r/o new infarct.  NIHSS ~15 premrs 2  CTA H/N no LVO   A1c 6.1  LDL 51   MRI brain acute L MCA infarct   TTE EF 57% no vegetation   CTH 6/29: subacute L freontal infarct . chornic bilateral occipital and R thalalmic infarct     Imprssion:   Stroke, possible related to non adhearance to DOAC ; multiple chronic strokes as well   AMS in setting of infection  MCI/dementia     - c/w elqiuis 2.5mg BID  - rosuvstatin 20mg QHS  - infectious workup on vanco/zosyn    - telemetry  - PT/OT/SS/SLP, OOBC   - check FS, glucose control <180  - GI/DVT ppx  - Counseling on diet, exercise, and medication adherence was done  - Counseling on smoking cessation and alcohol consumption offered when appropriate.  - Pain assessed and judicious use of narcotics when appropriate was discussed.    - Stroke education given when appropriate.  - Importance of fall prevention discussed.   - Differential diagnosis and plan of care discussed with patient and/or family and primary team  - Thank you for allowing me to participate in the care of this patient. Call with questions.   Antoine Cordero MD  Vascular Neurology  Office: [phone]

## 2025-07-01 NOTE — PROGRESS NOTE ADULT - ASSESSMENT
89-yo F w/ PMH of CVA (2015) w/ residual L vision loss, afib on Eliquis, and HTN, admitted for inability to speak or ambulate. Acute L MCA territory infarction noted. UA 6/24 unremarkable. UCx w/ >100k CFU/mL pansensitive E coli, s/p ceftriaxone 6/24-28. RRT on 6/29 for unresponsiveness, found to be hypotensive and febrile to 101. Started on vancomycin and Zosyn. Need for infectious workup. Aspiration should be included in differential.    Mental status greatly improved the next day. CT C/A/P w/ clear lungs and large stool load. Procal elevated compared to prior. Possibly aspiration pneumonitis?  MRSA swab neg.    #AMS  #CVA  #Fever  #Hypotension  #Debility  #At risk for aspiration    Recommendations:  - F/u BCx and UCx   - Continue with Zosyn 3.375 g IV Q8H. Tentative end date 7/3  - D/c vancomycin  - Monitor WBC and VS  - F/u Neuro  - Bed head elevation. Aspiration precautions.    Topics relating to disease transmission risk assessment and mitigation, complex antimicrobial therapy counseling and treatment, and/or public health investigation, analysis, and testing were addressed.    Thank you for this consult. Inpatient ID team will follow.  Plan discussed with primary team clinician.  Discussed with daughter.      Gigi Miller MD, PhD  Attending Physician  [facility] of Infectious Diseases  Department of Medicine    Please contact through MS Teams message.  Office: [phone] (after 5 PM or weekend).

## 2025-07-01 NOTE — ADVANCED PRACTICE NURSE CONSULT - RECOMMEDATIONS
Impression    Fecal incontinence  Urinary incontinence  Sacrum [Miah-Rectum] Hyperpigmentation cannot R/O DTI POA    Recommendations    1. Sacrum / Miah-Rectum - Hyperpigmented skin   Topical therapy- sacral/bilateral buttocks- cleanse w/incontinent cleanser, pat dry & apply Sinai moisturizer twice daily & prn soiling. Monitor for changes      2. Incontinent management - incontinent cleanser, pads, miah care BID     3. Maintain on an alternating air with low air loss surface      4. Turn & reposition every 2 hr; Use positioning pillow to turn and reposition, soft pillow between bony prominences; continue measures to decrease friction/shear/pressure.     5. Nutrition optimization.     6. Place waffle cushion when out of bed to chair.

## 2025-07-01 NOTE — PROGRESS NOTE ADULT - ASSESSMENT
89 female h/o cva, htn, chol, afib on eliquis, here with r/o cva     cva  neuro consult f/u  CT noted   MRI noted   tte noted  serial neuro exams  eliquis/statin  neuro f/u noted  PT/OT     uti  s/p ceftriaxone     afib  AC with eliquis  rate control  BB resumed   cards f/u    lower ext pain  f/u dopplers r/o dvt - neg  trial of gabapentin     new fever and hypotension  abx changed to zosyn  f/u repeat cult  f/u CT c/a/p - no acute pathology   ID consult f/u   ID f/u re abx plan    constipation  bowel regimen     cont other home meds      PT    d/w daughter bedside     dc planning rehab    I reviewed the course of events on the unit, re-confirming the patient history.   I discussed the care with the patient and their family. The plan of care was discussed with the ACP team.  Advanced care planning was discussed with patient and family.  Advanced care planning forms were reviewed and discussed as appropriate.  Differential diagnosis and plan of care discussed with patient after the evaluation.   Pain assessed and judicious use of narcotics when appropriate was discussed.  Importance of Fall prevention discussed.  Counseling on Smoking and Alcohol cessation was offered when appropriate.  Counseling on Diet, exercise, and medication compliance was done.

## 2025-07-01 NOTE — ADVANCED PRACTICE NURSE CONSULT - REASON FOR CONSULT
Wound consult requested to assess skin status; Sacrum/Buttocks suspected deep tissue injury    HPI: 88 yo female with PMH HTN, hx CVA, afib on eliquis, HLD presents with slurred speech.    Dx:       Acute Stroke - Slurred speech, R hand weakness. AMS s/p code stroke,   -CT Head 6/29: Small acute to subacute infarct in the left frontal cortex   Acute left MCA territory infarctions on MR 6/25. along with Chronic bilateral occipital infarcts and a right thalamic lacunar               Afib on Eliquis              UTI s/p  CTX -               6/29 Poss aspiration pneumonitis - Procal 0.42, Lactate 3 - Vanco and Zosyn   6/29 RRT for hypotension, fever 101 AMS   - CT C/A/P w/ clear lungs and large stool load.               Constipation - s/p enema and supp with +BM              RLE pain - duplex neg, gabapentin started 6/28

## 2025-07-01 NOTE — CONSULT NOTE ADULT - SUBJECTIVE AND OBJECTIVE BOX
Neurology Consult    Reason for Consult: Patient is a 89y old  Female who presents with a chief complaint of     HPI:   89 yo female, with PMHx of HTN, HLD, prior stroke in 2015 with residual left eye vision loss (bilateral occipital lobes and right posteromedial temporal lobe, right thalamic lacunar, right basal ganglia, right paramedian midbrain stroke), Afib (on Eliquis), cognitive decline (follows with Dr. Argueta), BIBEMS due to inability to speak or ambulate. LKW at around 3 AM on 6/24/2025. Daughter at bedside reports that she noted that patient was only making grunting sounds in response to conversation and appeared to have difficulty recognizing her daughter when she saw patient on 6/24/2025 at around 9 AM. However, daughter reports that patient has now returned to baseline. Of note, daughter stated that patient was reporting abdominal pain on 6/23/2025. Ambulates using a cane at baseline, requires assistance with eating and bathing. Reports adherence to Eliquis currently but reportedly had not been taking Eliquis for about 1 month for unknown reasons. Translation from Cantonese provided by daughter at bedside per patient and family preference.  (24 Jun 2025 18:58)       PAST MEDICAL & SURGICAL HISTORY:  Hypertension      Hyperlipidemia      CVA (cerebral vascular accident)      Afib      No significant past surgical history          Allergies: Allergies    shellfish (Unknown)  No Known Drug Allergies    Intolerances        Social History: Denies toxic habits including tobacco, ETOH or illicit drugs.    Family History: FAMILY HISTORY:  No pertinent family history in first degree relatives    . No family history of strokes    Medications: MEDICATIONS  (STANDING):  acetaminophen     Tablet .. 650 milliGRAM(s) Oral once  apixaban 2.5 milliGRAM(s) Oral every 12 hours  gabapentin 100 milliGRAM(s) Oral three times a day  lactated ringers. 1000 milliLiter(s) (100 mL/Hr) IV Continuous <Continuous>  lidocaine   4% Patch 1 Patch Transdermal daily  metoprolol tartrate 50 milliGRAM(s) Oral two times a day  mirtazapine 15 milliGRAM(s) Oral at bedtime  piperacillin/tazobactam IVPB.. 3.375 Gram(s) IV Intermittent every 8 hours  polyethylene glycol 3350 17 Gram(s) Oral daily  rosuvastatin 20 milliGRAM(s) Oral at bedtime  senna 2 Tablet(s) Oral at bedtime  vancomycin  IVPB 750 milliGRAM(s) IV Intermittent every 12 hours    MEDICATIONS  (PRN):  acetaminophen     Tablet .. 650 milliGRAM(s) Oral every 6 hours PRN Moderate Pain (4 - 6)      Review of Systems: limited   CONSTITUTIONAL:  No weight loss, fever, chills, weakness or fatigue.  HEENT:  Eyes:  No visual loss, blurred vision, double vision or yellow sclera. Ears, Nose, Throat:  No hearing loss, sneezing, congestion, runny nose or sore throat.  SKIN:  No rash or itching.  CARDIOVASCULAR:  No chest pain, chest pressure or chest discomfort. No palpitations or edema.  RESPIRATORY:  No shortness of breath, cough or sputum.  GASTROINTESTINAL:  No anorexia, nausea, vomiting or diarrhea. No abdominal pain or blood.  GENITOURINARY:  No burning on urination or incontinence   NEUROLOGICAL:  No headache, dizziness, syncope, paralysis, ataxia, numbness or tingling in the extremities. No change in bowel or bladder control. no limb weakness. no vision changes.   MUSCULOSKELETAL:  No muscle, back pain, joint pain or stiffness.  HEMATOLOGIC:  No anemia, bleeding or bruising.  LYMPHATICS:  No enlarged nodes. No history of splenectomy.  PSYCHIATRIC:  No history of depression or anxiety.  ENDOCRINOLOGIC:  No reports of sweating, cold or heat intolerance. No polyuria or polydipsia.      Vitals:  Vital Signs Last 24 Hrs  T(C): 36.5 (01 Jul 2025 06:41), Max: 37 (30 Jun 2025 11:03)  T(F): 97.7 (01 Jul 2025 06:41), Max: 98.6 (30 Jun 2025 11:03)  HR: 106 (01 Jul 2025 06:41) (95 - 106)  BP: 107/66 (01 Jul 2025 06:41) (107/66 - 132/81)  BP(mean): --  RR: 18 (01 Jul 2025 06:41) (18 - 18)  SpO2: 96% (01 Jul 2025 06:41) (94% - 98%)    Parameters below as of 01 Jul 2025 06:41  Patient On (Oxygen Delivery Method): nasal cannula  O2 Flow (L/min): 2      General Exam:   General Appearance: Appropriately dressed and in no acute distress       Head: Normocephalic, atraumatic and no dysmorphic features  Ear, Nose, and Throat: Moist mucous membranes  CVS: S1S2+  Resp: No SOB, no wheeze or rhonchi  GI: soft NT/ND  Extremities: No edema or cyanosis  Skin: No bruises or rashes     Neurological Exam:  MS - AAO x2, speech fluent, rep/naming intact, follows commands  CN - PERRL, visual fields intact in right eye, unable to test if visual fields are intact in left eye as patient is unable to keep her eye open for testing, EOMI, face sensation (V1-V3) intact b/l, facial strength intact without asymmetry b/l, hearing intact b/l, palate with symmetric elevation, trapezius 5/5 strength b/l, tongue midline on protrusion with full lateral movement  Motor - Normal bulk/tone, strength 5/5 entire L side, RUE 3/5, RLE 4/5, RUE drift, pronator drift. R wrist drop  Sens - LT intact all  DTR's - Neutral b/l plantar response  Coord - Coordination grossly intact for level of strength, no tremors appreciated  Gait and station - Due to fall risk/safety concerns did not assess    Data/Labs/Imaging which I personally reviewed.     Labs:     CBC Full  -  ( 01 Jul 2025 05:51 )  WBC Count : 11.39 K/uL  RBC Count : 3.60 M/uL  Hemoglobin : 11.1 g/dL  Hematocrit : 33.6 %  Platelet Count - Automated : 193 K/uL  Mean Cell Volume : 93.3 fl  Mean Cell Hemoglobin : 30.8 pg  Mean Cell Hemoglobin Concentration : 33.0 g/dL  Auto Neutrophil # : x  Auto Lymphocyte # : x  Auto Monocyte # : x  Auto Eosinophil # : x  Auto Basophil # : x  Auto Neutrophil % : x  Auto Lymphocyte % : x  Auto Monocyte % : x  Auto Eosinophil % : x  Auto Basophil % : x    07-01    137  |  100  |  10  ----------------------------<  137[H]  3.6   |  24  |  0.79    Ca    7.9[L]      01 Jul 2025 05:53  Phos  2.8     06-30  Mg     1.6     06-30          Urinalysis Basic - ( 01 Jul 2025 05:53 )    Color: x / Appearance: x / SG: x / pH: x  Gluc: 137 mg/dL / Ketone: x  / Bili: x / Urobili: x   Blood: x / Protein: x / Nitrite: x   Leuk Esterase: x / RBC: x / WBC x   Sq Epi: x / Non Sq Epi: x / Bacteria: x        CT PERFUSION: No perfusion deficit.    CTA NECK AND HEAD: No large vessel occlusion or major stenosis.    --- End of Report ---    < end of copied text >    < from: CT Brain Stroke Protocol (06.24.25 @ 10:16) >    FINDINGS: There is no acute intracranial hemorrhage, mass effect, shift   of the midline structures, herniation, extra-axial fluid collection, or   hydrocephalus.    Chronic infarcts are seen within the bilateral occipital lobes and right   posteromedial temporal lobe. There is also a chronic lacunar infarct   within the right thalamus and also within the right basal ganglia. A   chronic infarct within the right paramedian midbrain is seen.    There is mild diffuse cerebral volume loss with prominence of the sulci,   fissures, and cisternal spaces which is normal for the patient's age.   There is mild deep and periventricular white matter hypoattenuation   statistically compatible with microvascular changes given calcific   atherosclerotic disease of the intracranial arteries.    Minor scattered mucosal thickening is seen throughout the paranasal   sinuses. The tympanomastoid cavities are clear. The calvarium is intact.   There is evidence of bilateral cataract removal.    IMPRESSION: No acute intracranial hemorrhage, mass effect, or shift of   the midline structures.    Multiple additional unchanged chronic intracranial findings, as discussed.    --- End of Report ---    < end of copied text >    IMPRESSION: Acute left MCA territory infarctions    IMPRESSION: R. hemiparesis likely due to L. MCA area infarct. Mechanism pending further workup.

## 2025-07-01 NOTE — ADVANCED PRACTICE NURSE CONSULT - ASSESSMENT
Arrived on 4 [facility] on 7/1/25. Patient is awake, confused. Patient's daughter at bed side. Patient was found lying in a low air loss pressure redistribution support surface style bed. Introduced self and role as Wound RN. Patient's daughter expressed consent for skin examination. Once consent was granted, able to view her skin. Patient required some assistance with turning. Once turned, bel-care was provided evidence by large, liquid stool on skin, and underpad. Once bel-care was provided, able to view her skin. Patient with a Prima Fit external catheter for urinary diversion.     Skin evaluation reveals;  ·	Sacrum / Bel-rectal hyperpigmentation secondary to chronic moisture. This is likely due to both fecal and urinary incontinence. Due to hyperpigmentation and intact skin, cannot rule out deep tissue injury with pressure involvement Size approximately 8.0 cm x 7.0 cm x 0.0 cm. There are no open wounds or ulcerations. Skin is intact, warm. Cleansed with incontinence cleanser, pat dry, and applied Sinai at bed side. Recommended to apply Sinai moisturizing cream for soothing inflammation related to incontinence, and to block out wetness, which can result in skin erosion.     Once the consultation was complete, patient and RN were educated regarding the need of prompt and thorough cleansing of the skin after each incontinent episode. Recommended using a pH-balanced skin cleanser and avoiding harsh soaps or detergents. Advised against scrubbing the skin vigorously. Instructed to pat the skin dry gently.     When consultation was completed, the patient was left in a right sided position, with side rails up, call bell within reach, and bed in lowest position.     Plan of care discussed with Manuela Campos (RN).

## 2025-07-01 NOTE — PROGRESS NOTE ADULT - SUBJECTIVE AND OBJECTIVE BOX
CARDIOLOGY FOLLOW UP - Dr. Meneses( sandrita coverage)  Date of Service: 07-01-25 @ 12:54    CC: no events    Review of Systems:  Constitutional: No fever, weight loss, or fatigue  Respiratory: No cough, wheezing, or hemoptysis, no shortness of breath  Cardiovascular: No chest pain, palpitations, passing out, dizziness, or leg swelling  Gastrointestinal: No abd or epigastric pain. No nausea, vomiting, or hematemesis; no diarrhea or consiptaiton, no melena or hematochezia  Vascular: No edema     TELEMETRY:    PHYSICAL EXAM:  T(C): 36.8 (07-01-25 @ 12:11), Max: 36.8 (07-01-25 @ 12:11)  HR: 79 (07-01-25 @ 12:11) (79 - 106)  BP: 92/56 (07-01-25 @ 12:11) (92/56 - 115/72)  RR: 18 (07-01-25 @ 12:11) (18 - 18)  SpO2: 98% (07-01-25 @ 12:11) (96% - 98%)  Wt(kg): --  I&O's Summary    30 Jun 2025 07:01  -  01 Jul 2025 07:00  --------------------------------------------------------  IN: 780 mL / OUT: 1000 mL / NET: -220 mL    01 Jul 2025 07:01  -  01 Jul 2025 12:54  --------------------------------------------------------  IN: 250 mL / OUT: 0 mL / NET: 250 mL        Appearance: Normal	  Cardiovascular: Normal S1 S2,RRR, No JVD, No murmurs  Respiratory: Lungs clear to auscultation	  Gastrointestinal:  Soft, Non-tender, + BS	  Extremities: Normal range of motion, No clubbing, cyanosis or edema  Vascular: Peripheral pulses palpable 2+ bilaterally       Home Medications:  Eliquis 2.5 mg oral tablet: 1 tab(s) orally 2 times a day (24 Jun 2025 18:25)  metoprolol succinate 100 mg oral tablet, extended release: 1 tab(s) orally once a day (24 Jun 2025 18:25)  mirtazapine 15 mg oral tablet: 1 tab(s) orally once a day (at bedtime) (24 Jun 2025 18:25)  Multiple Vitamins oral tablet: 1 tab(s) orally once a day (24 Jun 2025 18:58)  olmesartan 40 mg oral tablet: 1 tab(s) orally once a day (24 Jun 2025 18:25)  Potassium Chloride (Eqv-Klor-Con M20) 20 mEq oral tablet, extended release: 1 tab(s) orally once a day (with meals) (24 Jun 2025 18:33)  rosuvastatin 20 mg oral tablet: 1 tab(s) orally once a day (24 Jun 2025 18:25)  triamcinolone 0.1% topical lotion: Apply topically to affected area once a day as needed for  rash (24 Jun 2025 18:25)        MEDICATIONS  (STANDING):  acetaminophen     Tablet .. 650 milliGRAM(s) Oral once  apixaban 2.5 milliGRAM(s) Oral every 12 hours  gabapentin 100 milliGRAM(s) Oral three times a day  lactated ringers. 1000 milliLiter(s) (100 mL/Hr) IV Continuous <Continuous>  lidocaine   4% Patch 1 Patch Transdermal daily  metoprolol tartrate 50 milliGRAM(s) Oral two times a day  mirtazapine 15 milliGRAM(s) Oral at bedtime  piperacillin/tazobactam IVPB.. 3.375 Gram(s) IV Intermittent every 8 hours  polyethylene glycol 3350 17 Gram(s) Oral daily  rosuvastatin 20 milliGRAM(s) Oral at bedtime  senna 2 Tablet(s) Oral at bedtime  vancomycin  IVPB 750 milliGRAM(s) IV Intermittent every 12 hours        EKG:  RADIOLOGY:  DIAGNOSTIC TESTING:  [ ] Echocardiogram:  [ ] Catherterization:  [ ] Stress Test:  OTHER:     LABS:	 	                          11.1   11.39 )-----------( 193      ( 01 Jul 2025 05:51 )             33.6     07-01    137  |  100  |  10  ----------------------------<  137[H]  3.6   |  24  |  0.79    Ca    7.9[L]      01 Jul 2025 05:53  Phos  2.8     06-30  Mg     1.6     06-30            CARDIAC MARKERS:

## 2025-07-02 LAB
ANION GAP SERPL CALC-SCNC: 14 MMOL/L — SIGNIFICANT CHANGE UP (ref 5–17)
BUN SERPL-MCNC: 11 MG/DL — SIGNIFICANT CHANGE UP (ref 7–23)
CALCIUM SERPL-MCNC: 8.7 MG/DL — SIGNIFICANT CHANGE UP (ref 8.4–10.5)
CHLORIDE SERPL-SCNC: 101 MMOL/L — SIGNIFICANT CHANGE UP (ref 96–108)
CO2 SERPL-SCNC: 25 MMOL/L — SIGNIFICANT CHANGE UP (ref 22–31)
CREAT SERPL-MCNC: 0.82 MG/DL — SIGNIFICANT CHANGE UP (ref 0.5–1.3)
EGFR: 68 ML/MIN/1.73M2 — SIGNIFICANT CHANGE UP
EGFR: 68 ML/MIN/1.73M2 — SIGNIFICANT CHANGE UP
GLUCOSE SERPL-MCNC: 105 MG/DL — HIGH (ref 70–99)
HCT VFR BLD CALC: 36.6 % — SIGNIFICANT CHANGE UP (ref 34.5–45)
HGB BLD-MCNC: 11.7 G/DL — SIGNIFICANT CHANGE UP (ref 11.5–15.5)
MCHC RBC-ENTMCNC: 30.3 PG — SIGNIFICANT CHANGE UP (ref 27–34)
MCHC RBC-ENTMCNC: 32 G/DL — SIGNIFICANT CHANGE UP (ref 32–36)
MCV RBC AUTO: 94.8 FL — SIGNIFICANT CHANGE UP (ref 80–100)
NRBC # BLD AUTO: 0 K/UL — SIGNIFICANT CHANGE UP (ref 0–0)
NRBC # FLD: 0 K/UL — SIGNIFICANT CHANGE UP (ref 0–0)
NRBC BLD AUTO-RTO: 0 /100 WBCS — SIGNIFICANT CHANGE UP (ref 0–0)
PLATELET # BLD AUTO: 216 K/UL — SIGNIFICANT CHANGE UP (ref 150–400)
PMV BLD: 10.9 FL — SIGNIFICANT CHANGE UP (ref 7–13)
POTASSIUM SERPL-MCNC: 3.7 MMOL/L — SIGNIFICANT CHANGE UP (ref 3.5–5.3)
POTASSIUM SERPL-SCNC: 3.7 MMOL/L — SIGNIFICANT CHANGE UP (ref 3.5–5.3)
RBC # BLD: 3.86 M/UL — SIGNIFICANT CHANGE UP (ref 3.8–5.2)
RBC # FLD: 12.2 % — SIGNIFICANT CHANGE UP (ref 10.3–14.5)
SODIUM SERPL-SCNC: 140 MMOL/L — SIGNIFICANT CHANGE UP (ref 135–145)
WBC # BLD: 7.71 K/UL — SIGNIFICANT CHANGE UP (ref 3.8–10.5)
WBC # FLD AUTO: 7.71 K/UL — SIGNIFICANT CHANGE UP (ref 3.8–10.5)

## 2025-07-02 PROCEDURE — 99222 1ST HOSP IP/OBS MODERATE 55: CPT

## 2025-07-02 RX ORDER — METOPROLOL SUCCINATE 50 MG/1
25 TABLET, EXTENDED RELEASE ORAL
Refills: 0 | Status: DISCONTINUED | OUTPATIENT
Start: 2025-07-02 | End: 2025-07-04

## 2025-07-02 RX ADMIN — Medication 25 GRAM(S): at 02:46

## 2025-07-02 RX ADMIN — APIXABAN 2.5 MILLIGRAM(S): 2.5 TABLET, FILM COATED ORAL at 19:51

## 2025-07-02 RX ADMIN — Medication 25 GRAM(S): at 23:53

## 2025-07-02 RX ADMIN — Medication 25 GRAM(S): at 08:31

## 2025-07-02 RX ADMIN — GABAPENTIN 100 MILLIGRAM(S): 400 CAPSULE ORAL at 22:26

## 2025-07-02 RX ADMIN — MIRTAZAPINE 15 MILLIGRAM(S): 30 TABLET, FILM COATED ORAL at 22:26

## 2025-07-02 RX ADMIN — Medication 2 TABLET(S): at 22:26

## 2025-07-02 RX ADMIN — Medication 25 GRAM(S): at 15:49

## 2025-07-02 RX ADMIN — ROSUVASTATIN CALCIUM 20 MILLIGRAM(S): 5 TABLET, FILM COATED ORAL at 22:26

## 2025-07-02 RX ADMIN — LIDOCAINE HYDROCHLORIDE 1 PATCH: 20 JELLY TOPICAL at 23:56

## 2025-07-02 RX ADMIN — LIDOCAINE HYDROCHLORIDE 1 PATCH: 20 JELLY TOPICAL at 19:49

## 2025-07-02 RX ADMIN — METOPROLOL SUCCINATE 50 MILLIGRAM(S): 50 TABLET, EXTENDED RELEASE ORAL at 05:53

## 2025-07-02 RX ADMIN — APIXABAN 2.5 MILLIGRAM(S): 2.5 TABLET, FILM COATED ORAL at 08:32

## 2025-07-02 RX ADMIN — LIDOCAINE HYDROCHLORIDE 1 PATCH: 20 JELLY TOPICAL at 11:04

## 2025-07-02 RX ADMIN — METOPROLOL SUCCINATE 25 MILLIGRAM(S): 50 TABLET, EXTENDED RELEASE ORAL at 17:27

## 2025-07-02 RX ADMIN — GABAPENTIN 100 MILLIGRAM(S): 400 CAPSULE ORAL at 14:55

## 2025-07-02 RX ADMIN — GABAPENTIN 100 MILLIGRAM(S): 400 CAPSULE ORAL at 06:10

## 2025-07-02 NOTE — PROGRESS NOTE ADULT - ASSESSMENT
89 yo female, with  HTN, HLD, prior stroke in 2015 with residual left eye vision loss (bilateral occipital lobes and right posteromedial temporal lobe, right thalamic lacunar, right basal ganglia, right paramedian midbrain stroke), Afib (on Eliquis), cognitive decline (follows with Dr. Argueta), BIBEMS due to inability to speak or ambulate. Reports adherence to Eliquis currently but reportedly had not been taking Eliquis for about 1 month for unknown reasons.    On exam, entire R side notably weaker than L, RUE>RLE, + RUE drift. Pending MRI brain to r/o new infarct.  NIHSS ~15 premrs 2  CTA H/N no LVO   A1c 6.1  LDL 51   MRI brain acute L MCA infarct   TTE EF 57% no vegetation   CTH 6/29: subacute L freontal infarct . chornic bilateral occipital and R thalalmic infarct     Imprssion:   Stroke, possible related to non adhearance to DOAC ; multiple chronic strokes as well   AMS in setting of infection  MCI/dementia     - c/w elqiuis 2.5mg BID  - rosuvstatin 20mg QHS  - infectious workup on zosyn    - telemetry  - PT/OT/SS/SLP, OOBC   - check FS, glucose control <180  - GI/DVT ppx  - Thank you for allowing me to participate in the care of this patient. Call with questions.   Antoine Cordero MD  Vascular Neurology  Office: [phone]

## 2025-07-02 NOTE — SWALLOW BEDSIDE ASSESSMENT ADULT - ORAL PHASE
prolonged mastication with incomplete bolus breakdown, slow a-p transit with regular solids Within functional limits

## 2025-07-02 NOTE — SWALLOW BEDSIDE ASSESSMENT ADULT - ASR SWALLOW RECOMMEND DIAG
Anticipated Discharge Disposition:   SNF    Action:    Pts daughter, Mazin returned call.  She stated that she had hoped patient could go to [facility]. Mazin lives in [place] and will be traveling to [place] this Friday or Saturday.  Patient lives alone.  Informed Mazin of accepting SNF facilities.  Patient asked RN IONA to speak with Mazin about which facility to choose.  Mazin stated she would research facilities and contact RN CM tomorrow.    Barriers to Discharge:    Medical clearance    Plan:    F/U with patient's daughter regarding final choice for SNF.   not clinically indicated at this time; can consider if team c/f silent aspiration

## 2025-07-02 NOTE — SWALLOW BEDSIDE ASSESSMENT ADULT - COMMENTS
IMAGING: CTA NECK: There is a standard anatomic configuration to the aortic arch. Ectasia of the ascending aorta is seen measuring up to 3.5 cm. Atherosclerosis affects the arch and origins of the great vessels without significant stenosis. The bilateral common carotid arteries demonstrate minimal scattered atherosclerosis without associated stenosis. The bilateral carotid bifurcations demonstrate atherosclerosis without significant stenosis. The cervical internal carotid arteries are patent extending to the skull   base. The origins of the bilateral vertebral arteries are normal. The left vertebral artery stent compared to the right. The bilateral vertebral   arteries are patent. A 1 cm hyperenhancing right-sided thyroid nodule is seen. CTA HEAD: Atherosclerosis affects the bilateral carotid siphons without   surrounding stenosis. Otherwise, the bilateral intracranial internal carotid, anterior, and middle cerebral arteries appear unremarkable. The anterior and bilateral posterior communicating arteries are seen. The posterior circulation appears patent. No aneurysms or AV malformations are seen. The intracranial venous structures are patent. IMPRESSION: CT PERFUSION: No perfusion deficit. CTA NECK AND HEAD: No large vessel occlusion or major stenosis. CTCAP: Prominent distention of the rectum with stool, in keeping with the clinical history of constipation. Trace bilateral pleural effusions with adjacent passive atelectasis. Otherwise clear lungs. CT Head 6/29: Small acute to subacute infarct in the left frontal cortex seen better on MRI 6/25/2025. No hemorrhage. Chronic bilateral occipital infarcts and a right thalamic lacunar infarct is again noted.    *Seen by this service 04/2015 with recommendations for regular texture diet. Also seen for SLE on this admission with findings of functional speech, language and cognitive-linguistic skills for ADL's. THIS ADMISSION: Failed dysphagia screen 6/24, placed on regular diet by team.

## 2025-07-02 NOTE — SWALLOW BEDSIDE ASSESSMENT ADULT - NS ASR SWALLOW FINDINGS DISCUS
patient; KIMBERLY Calabrese; attempted to call daughter (emergency contact), unable to reach or leave nonurgent vm.

## 2025-07-02 NOTE — SWALLOW BEDSIDE ASSESSMENT ADULT - H & P REVIEW
89 yo female, with PMHx of HTN, HLD, prior stroke in 2015 with residual left eye vision loss (bilateral occipital lobes and right posteromedial temporal lobe, right thalamic lacunar, right basal ganglia, right paramedian midbrain stroke), Afib (on Eliquis), cognitive decline. BIBEMS due to inability to speak or ambulate. LKW at around 3 AM on 6/24/2025. Daughter at bedside reports that she noted that patient was only making grunting sounds in response to conversation and appeared to have difficulty recognizing her daughter when she saw patient on 6/24/2025 at around 9 AM. However, daughter reports that patient has now returned to baseline. Of note, daughter stated that patient was reporting abdominal pain on 6/23/2025. Ambulates using a cane at baseline, requires assistance with eating and bathing. Reports adherence to Eliquis currently but reportedly had not been taking Eliquis for about 1 month for unknown reasons. Seen by neuro -> Daughter reports improvement in pt mental status with ongoing treatment of UTI. Dysarthria resolved, confusion improving. Of note, pt continues to report R hand weakness. On exam, entire R side notably weaker than L, RUE>RLE, + RUE drift. MRI Head: Acute left MCA territory infarctions. TTE reviewed and WNL. 6/29 - febrile to 100.6. Pt was difficult to arouse and had difficulty swallowing applesauce and pills. RRT on this day for unresponsiveness, found to be hypotensive and febrile to 101. Started on vancomycin and Zosyn. Need for infectious workup. Aspiration should be included in differential. Cards c/s -> episodes of RVR, off bb past few days. ID -> CT C/A/P w/ clear lungs and large stool load. Procal elevated compared to prior. Possibly aspiration pneumonitis?. MRSA swab neg./yes

## 2025-07-02 NOTE — SWALLOW BEDSIDE ASSESSMENT ADULT - SLP GENERAL OBSERVATIONS
Patient received resting upright ~45 degrees; 2L O2 via NC; lethargic but rousable. Cantonese  utilized (ID #[id number]); AAOx2-3 (self, place. time with choices); verbal output fluent; able to follow commands and make simple wants/needs known.

## 2025-07-02 NOTE — SWALLOW BEDSIDE ASSESSMENT ADULT - SWALLOW EVAL: DIAGNOSIS
Patient is an 86 year old female with pmhx of HTN, CVA, Afib, p/w difficulty with speaking and ambulation. found w acute left MCA territory infarctions. Patient now presents with evidence of a mild oropharyngeal dysphagia. Prolonged mastication with incomplete bolus breakdown of regular solids with slow a-p transit (suspect iso missing dentition). Suspect delay in pharyngeal swallow trigger. No overt s/s of aspiration observed across trials. Recommend softer solids at this time. This service will continue to follow while in-house as schedule permits.

## 2025-07-02 NOTE — CONSULT NOTE ADULT - TIME BILLING
The time spent for this encounter includes time spent reviewing patient's chart, notes from other providers, including PT, OT, and/or SLP notes and assessment in relation to the patient's current rehab course. Time spent also includes time evaluating the patient, including history taking, physical exam, determining the patient's home situation (which may also include time spent discussing this with family or support system), inquiry of any equipments or modifications that may be needed if not already present, review of medications and treatment plan,  as well as coordinating care with the patient's primary team, , , and rehabilitation team.
Chart review, exam, coordination of care

## 2025-07-02 NOTE — PROGRESS NOTE ADULT - SUBJECTIVE AND OBJECTIVE BOX
CARDIOLOGY FOLLOW UP - Dr. Meneses  Date of Service: 07-02-25 @ 17:20    CC: no events    Review of Systems:  Constitutional: No fever, weight loss, or fatigue  Respiratory: No cough, wheezing, or hemoptysis, no shortness of breath  Cardiovascular: No chest pain, palpitations, passing out, dizziness, or leg swelling  Gastrointestinal: No abd or epigastric pain. No nausea, vomiting, or hematemesis; no diarrhea or consiptaiton, no melena or hematochezia  Vascular: No edema     TELEMETRY:    PHYSICAL EXAM:  T(C): 36.4 (07-02-25 @ 11:31), Max: 36.9 (07-01-25 @ 20:54)  HR: 73 (07-02-25 @ 11:31) (73 - 80)  BP: 92/54 (07-02-25 @ 11:31) (92/54 - 119/65)  RR: 18 (07-02-25 @ 11:31) (18 - 18)  SpO2: 97% (07-02-25 @ 11:31) (97% - 99%)  Wt(kg): --  I&O's Summary    01 Jul 2025 07:01  -  02 Jul 2025 07:00  --------------------------------------------------------  IN: 350 mL / OUT: 1000 mL / NET: -650 mL    02 Jul 2025 07:01  -  02 Jul 2025 17:20  --------------------------------------------------------  IN: 560 mL / OUT: 400 mL / NET: 160 mL        Appearance: Normal	  Cardiovascular: Normal S1 S2,RRR, No JVD, No murmurs  Respiratory: Lungs clear to auscultation	  Gastrointestinal:  Soft, Non-tender, + BS	  Extremities: Normal range of motion, No clubbing, cyanosis or edema  Vascular: Peripheral pulses palpable 2+ bilaterally       Home Medications:  Eliquis 2.5 mg oral tablet: 1 tab(s) orally 2 times a day (24 Jun 2025 18:25)  metoprolol succinate 100 mg oral tablet, extended release: 1 tab(s) orally once a day (24 Jun 2025 18:25)  mirtazapine 15 mg oral tablet: 1 tab(s) orally once a day (at bedtime) (24 Jun 2025 18:25)  Multiple Vitamins oral tablet: 1 tab(s) orally once a day (24 Jun 2025 18:58)  olmesartan 40 mg oral tablet: 1 tab(s) orally once a day (24 Jun 2025 18:25)  Potassium Chloride (Eqv-Klor-Con M20) 20 mEq oral tablet, extended release: 1 tab(s) orally once a day (with meals) (24 Jun 2025 18:33)  rosuvastatin 20 mg oral tablet: 1 tab(s) orally once a day (24 Jun 2025 18:25)  triamcinolone 0.1% topical lotion: Apply topically to affected area once a day as needed for  rash (24 Jun 2025 18:25)        MEDICATIONS  (STANDING):  acetaminophen     Tablet .. 650 milliGRAM(s) Oral once  apixaban 2.5 milliGRAM(s) Oral every 12 hours  gabapentin 100 milliGRAM(s) Oral three times a day  lactated ringers. 1000 milliLiter(s) (100 mL/Hr) IV Continuous <Continuous>  lidocaine   4% Patch 1 Patch Transdermal daily  metoprolol tartrate 25 milliGRAM(s) Oral two times a day  mirtazapine 15 milliGRAM(s) Oral at bedtime  piperacillin/tazobactam IVPB.. 3.375 Gram(s) IV Intermittent every 8 hours  polyethylene glycol 3350 17 Gram(s) Oral daily  rosuvastatin 20 milliGRAM(s) Oral at bedtime  senna 2 Tablet(s) Oral at bedtime        EKG:  RADIOLOGY:  DIAGNOSTIC TESTING:  [ ] Echocardiogram:  [ ] Catherterization:  [ ] Stress Test:  OTHER:     LABS:	 	                          11.7   7.71  )-----------( 216      ( 02 Jul 2025 07:26 )             36.6     07-02    140  |  101  |  11  ----------------------------<  105[H]  3.7   |  25  |  0.82    Ca    8.7      02 Jul 2025 07:27            CARDIAC MARKERS:

## 2025-07-02 NOTE — PROGRESS NOTE ADULT - SUBJECTIVE AND OBJECTIVE BOX
KYLIE BENJAMIN  89y Female  MRN:[id number]    Patient is a 89y old  Female who presents with a chief complaint of slurred speech    HPI:   88 yo female, with PMHx of HTN, HLD, prior stroke in 2015 with residual left eye vision loss (bilateral occipital lobes and right posteromedial temporal lobe, right thalamic lacunar, right basal ganglia, right paramedian midbrain stroke), Afib (on Eliquis), cognitive decline (follows with Dr. Argueta), BIBEMS due to inability to speak or ambulate. LKW at around 3 AM on 6/24/2025. Daughter at bedside reports that she noted that patient was only making grunting sounds in response to conversation and appeared to have difficulty recognizing her daughter when she saw patient on 6/24/2025 at around 9 AM. However, daughter reports that patient has now returned to baseline. Of note, daughter stated that patient was reporting abdominal pain on 6/23/2025. Ambulates using a cane at baseline, requires assistance with eating and bathing. Reports adherence to Eliquis currently but reportedly had not been taking Eliquis for about 1 month for unknown reasons. Translation from Cantonese provided by daughter at bedside per patient and family preference.  (24 Jun 2025 18:58)      Patient seen and evaluated at bedside. No acute events overnight except as noted.    Interval HPI: no acute events o/n    PAST MEDICAL & SURGICAL HISTORY:  Hypertension      Hyperlipidemia      CVA (cerebral vascular accident)      Afib      No significant past surgical history          REVIEW OF SYSTEMS:  as per hpi       VITALS:    Vital Signs Last 24 Hrs  T(C): 36.4 (02 Jul 2025 11:31), Max: 36.9 (01 Jul 2025 20:54)  T(F): 97.6 (02 Jul 2025 11:31), Max: 98.4 (01 Jul 2025 20:54)  HR: 73 (02 Jul 2025 11:31) (73 - 80)  BP: 92/54 (02 Jul 2025 11:31) (92/54 - 131/74)  BP(mean): --  RR: 18 (02 Jul 2025 11:31) (18 - 18)  SpO2: 97% (02 Jul 2025 11:31) (97% - 99%)    Parameters below as of 02 Jul 2025 11:31  Patient On (Oxygen Delivery Method): nasal cannula  O2 Flow (L/min): 1        PHYSICAL EXAM:  GENERAL: NAD, well-developed, lethargic   HEAD:  Atraumatic, Normocephalic  EYES: EOMI, PERRLA, conjunctiva and sclera clear  NECK: Supple, No JVD  CHEST/LUNG: Clear to auscultation bilaterally; No wheeze  HEART: S1, S2; No murmurs, rubs, or gallops  ABDOMEN: Soft, Nontender, Nondistended; Bowel sounds present  EXTREMITIES:  2+ Peripheral Pulses, No clubbing, cyanosis, or edema  NEUROLOGY: AAOX3; +dysarthria   SKIN: No rashes or lesions    Consultant(s) Notes Reviewed:  [x ] YES  [ ] NO  Care Discussed with Consultants/Other Providers [ x] YES  [ ] NO    MEDS:   MEDICATIONS  (STANDING):  acetaminophen     Tablet .. 650 milliGRAM(s) Oral once  apixaban 2.5 milliGRAM(s) Oral every 12 hours  gabapentin 100 milliGRAM(s) Oral three times a day  lactated ringers. 1000 milliLiter(s) (100 mL/Hr) IV Continuous <Continuous>  lidocaine   4% Patch 1 Patch Transdermal daily  metoprolol tartrate 25 milliGRAM(s) Oral two times a day  mirtazapine 15 milliGRAM(s) Oral at bedtime  piperacillin/tazobactam IVPB.. 3.375 Gram(s) IV Intermittent every 8 hours  polyethylene glycol 3350 17 Gram(s) Oral daily  rosuvastatin 20 milliGRAM(s) Oral at bedtime  senna 2 Tablet(s) Oral at bedtime    MEDICATIONS  (PRN):  acetaminophen     Tablet .. 650 milliGRAM(s) Oral every 6 hours PRN Moderate Pain (4 - 6)      ALLERGIES:  shellfish (Unknown)  No Known Drug Allergies      LABS:                                                                   11.7   7.71  )-----------( 216      ( 02 Jul 2025 07:26 )             36.6   07-02    140  |  101  |  11  ----------------------------<  105[H]  3.7   |  25  |  0.82    Ca    8.7      02 Jul 2025 07:27          < from: CT Chest w/ IV Cont (06.29.25 @ 22:59) >  IMPRESSION:  Prominent distention of the rectum with stool, in keeping with the   clinical history of constipation.    Trace bilateral pleural effusions with adjacent passive atelectasis.   Otherwiseclear lungs.  .    --- End of Report ---    < end of copied text >        < from: CT Head No Cont (06.29.25 @ 13:35) >    IMPRESSION:  Small acute to subacute infarct in the left frontal cortex seen better on   MRI 6/25/2025. No hemorrhage.  Chronic bilateral occipital infarcts and a right thalamic lacunar infarct   is again noted.      < end of copied text >                 < from: [facility] Duplex Lower Ext Vein Scan, Bilat (06.27.25 @ 18:09) >  IMPRESSION:  No evidence of deep venous thrombosis in either lower extremity.        < end of copied text >                                   < from: TTE Limited W or WO Ultrasound Enhancing Agent (06.26.25 @ 14:40) >  _______________________________________________________________________________________     CONCLUSIONS:      1. Left ventricular cavity is small. Left ventricular wall thickness is normal. Left ventricular systolic function is normal with an ejection fraction of 57 % by Denson's method of disks. There are no regional wall motion abnormalities seen.   2. Normal right ventricular cavity size, with normal wall thickness, and normal right ventricular systolic function. Tricuspid annular plane systolic excursion (TAPSE) is 2.0 cm (normal >=1.7 cm).   3. Left atrium is mildly dilated.   4. The right atrium is mildly dilated.   5. No pericardial effusion seen.   6. No prior echocardiogram is available for comparison.   7. Agitated saline injection was negative for intracardiac shunt.   8. Severe tricuspid regurgitation.   9. There is normal LV mass and concentric remodeling.    ________________________________________________________________________________________    < end of copied text >      < from: MR Head No Cont (06.25.25 @ 20:50) >    IMPRESSION: Acute left MCA territory infarctions    --- End of Report ---        < end of copied text >           < from: CT Angio Neck Stroke Protocol w/ IV Cont (06.24.25 @ 10:18) >  IMPRESSION:    CT PERFUSION: No perfusion deficit.    CTA NECK AND HEAD: No large vessel occlusion or major stenosis.    --- End of Report ---      < end of copied text >

## 2025-07-02 NOTE — SWALLOW BEDSIDE ASSESSMENT ADULT - SWALLOW EVAL: RECOMMENDED FEEDING/EATING TECHNIQUES
medications whole or crushed in applesauce/allow for swallow between intakes/maintain upright posture during/after eating for 30 mins/position upright (90 degrees)/small sips/bites

## 2025-07-02 NOTE — PROGRESS NOTE ADULT - ASSESSMENT
89 female h/o cva, htn, chol, afib on eliquis, here with r/o cva     cva  neuro consult f/u  CT noted   MRI noted   tte noted  serial neuro exams  eliquis/statin  neuro f/u noted  PT/OT     uti  s/p ceftriaxone     afib  AC with eliquis  rate control  BB resumed   cards f/u    lower ext pain  f/u dopplers r/o dvt - neg  trial of gabapentin     new fever and hypotension  abx changed to zosyn  f/u repeat cult  f/u CT c/a/p - no acute pathology   ID consult f/u   ID f/u re abx plan  to finish abx tomorrow     constipation  bowel regimen     cont other home meds      PT    d/w daughter bedside     dc planning rehab    I reviewed the course of events on the unit, re-confirming the patient history.   I discussed the care with the patient and their family. The plan of care was discussed with the ACP team.  Advanced care planning was discussed with patient and family.  Advanced care planning forms were reviewed and discussed as appropriate.  Differential diagnosis and plan of care discussed with patient after the evaluation.   Pain assessed and judicious use of narcotics when appropriate was discussed.  Importance of Fall prevention discussed.  Counseling on Smoking and Alcohol cessation was offered when appropriate.  Counseling on Diet, exercise, and medication compliance was done.

## 2025-07-02 NOTE — PROGRESS NOTE ADULT - SUBJECTIVE AND OBJECTIVE BOX
Neurology      S: patient seen. doing okay       Medications: MEDICATIONS  (STANDING):  acetaminophen     Tablet .. 650 milliGRAM(s) Oral once  apixaban 2.5 milliGRAM(s) Oral every 12 hours  gabapentin 100 milliGRAM(s) Oral three times a day  lactated ringers. 1000 milliLiter(s) (100 mL/Hr) IV Continuous <Continuous>  lidocaine   4% Patch 1 Patch Transdermal daily  metoprolol tartrate 50 milliGRAM(s) Oral two times a day  mirtazapine 15 milliGRAM(s) Oral at bedtime  piperacillin/tazobactam IVPB.. 3.375 Gram(s) IV Intermittent every 8 hours  polyethylene glycol 3350 17 Gram(s) Oral daily  rosuvastatin 20 milliGRAM(s) Oral at bedtime  senna 2 Tablet(s) Oral at bedtime    MEDICATIONS  (PRN):  acetaminophen     Tablet .. 650 milliGRAM(s) Oral every 6 hours PRN Moderate Pain (4 - 6)       Vitals:  Vital Signs Last 24 Hrs  T(C): 36.8 (02 Jul 2025 04:12), Max: 36.9 (01 Jul 2025 20:54)  T(F): 98.3 (02 Jul 2025 04:12), Max: 98.4 (01 Jul 2025 20:54)  HR: 80 (02 Jul 2025 04:12) (76 - 80)  BP: 119/65 (02 Jul 2025 04:12) (92/56 - 131/74)  BP(mean): --  RR: 18 (02 Jul 2025 04:12) (18 - 18)  SpO2: 99% (02 Jul 2025 04:12) (98% - 99%)    Parameters below as of 02 Jul 2025 04:12  Patient On (Oxygen Delivery Method): room air          General Exam:   General Appearance: Appropriately dressed and in no acute distress       Head: Normocephalic, atraumatic and no dysmorphic features  Ear, Nose, and Throat: Moist mucous membranes  CVS: S1S2+  Resp: No SOB, no wheeze or rhonchi  GI: soft NT/ND  Extremities: No edema or cyanosis  Skin: No bruises or rashes     Neurological Exam:  MS - AAO x2, speech fluent, rep/naming intact, follows commands  CN - PERRL, visual fields intact in right eye, unable to test if visual fields are intact in left eye as patient is unable to keep her eye open for testing, EOMI, face sensation (V1-V3) intact b/l, facial strength intact without asymmetry b/l, hearing intact b/l, palate with symmetric elevation, trapezius 5/5 strength b/l, tongue midline on protrusion with full lateral movement  Motor - Normal bulk/tone, strength 5/5 entire L side, RUE 3/5, RLE 4/5, RUE drift, pronator drift. R wrist drop  Sens - LT intact all  DTR's - Neutral b/l plantar response  Coord - Coordination grossly intact for level of strength, no tremors appreciated  Gait and station - Due to fall risk/safety concerns did not assess    Data/Labs/Imaging which I personally reviewed.     Labs:       LABS:                          11.7   7.71  )-----------( 216      ( 02 Jul 2025 07:26 )             36.6     07-02    140  |  101  |  11  ----------------------------<  105[H]  3.7   |  25  |  0.82    Ca    8.7      02 Jul 2025 07:27          Urinalysis Basic - ( 02 Jul 2025 07:27 )    Color: x / Appearance: x / SG: x / pH: x  Gluc: 105 mg/dL / Ketone: x  / Bili: x / Urobili: x   Blood: x / Protein: x / Nitrite: x   Leuk Esterase: x / RBC: x / WBC x   Sq Epi: x / Non Sq Epi: x / Bacteria: x            CT PERFUSION: No perfusion deficit.    CTA NECK AND HEAD: No large vessel occlusion or major stenosis.    --- End of Report ---    < end of copied text >    < from: CT Brain Stroke Protocol (06.24.25 @ 10:16) >    FINDINGS: There is no acute intracranial hemorrhage, mass effect, shift   of the midline structures, herniation, extra-axial fluid collection, or   hydrocephalus.    Chronic infarcts are seen within the bilateral occipital lobes and right   posteromedial temporal lobe. There is also a chronic lacunar infarct   within the right thalamus and also within the right basal ganglia. A   chronic infarct within the right paramedian midbrain is seen.    There is mild diffuse cerebral volume loss with prominence of the sulci,   fissures, and cisternal spaces which is normal for the patient's age.   There is mild deep and periventricular white matter hypoattenuation   statistically compatible with microvascular changes given calcific   atherosclerotic disease of the intracranial arteries.    Minor scattered mucosal thickening is seen throughout the paranasal   sinuses. The tympanomastoid cavities are clear. The calvarium is intact.   There is evidence of bilateral cataract removal.    IMPRESSION: No acute intracranial hemorrhage, mass effect, or shift of   the midline structures.    Multiple additional unchanged chronic intracranial findings, as discussed.    --- End of Report ---    < end of copied text >    IMPRESSION: Acute left MCA territory infarctions    IMPRESSION: R. hemiparesis likely due to L. MCA area infarct. Mechanism pending further workup.

## 2025-07-02 NOTE — CONSULT NOTE ADULT - SUBJECTIVE AND OBJECTIVE BOX
89yF was admitted on 06-24    In ED, GCS=    Patient is a 89y old  Female who presents with a chief complaint of   HPI:   88 yo female, with PMHx of HTN, HLD, prior stroke in 2015 with residual left eye vision loss (bilateral occipital lobes and right posteromedial temporal lobe, right thalamic lacunar, right basal ganglia, right paramedian midbrain stroke), Afib (on Eliquis), cognitive decline (follows with Dr. Argueta), BIBEMS due to inability to speak or ambulate. LKW at around 3 AM on 6/24/2025. Daughter at bedside reports that she noted that patient was only making grunting sounds in response to conversation and appeared to have difficulty recognizing her daughter when she saw patient on 6/24/2025 at around 9 AM. However, daughter reports that patient has now returned to baseline. Of note, daughter stated that patient was reporting abdominal pain on 6/23/2025. Ambulates using a cane at baseline, requires assistance with eating and bathing. Reports adherence to Eliquis currently but reportedly had not been taking Eliquis for about 1 month for unknown reasons. Translation from Cantonese provided by daughter at bedside per patient and family preference.  (24 Jun 2025 18:58)      Imaging showed:  MRI Head - Acute Left MCA Infarct  CTH - Left frontal infarct and chronic bilateral occipital infarct and chronic Right thalamic infarct      REVIEW OF SYSTEMS  + RUE>RLE weakness  + difficulty ambulating  + difficulty w/ ADLs  + difficulty w/ transfers    VITALS  T(C): 36.4 (07-02-25 @ 11:31), Max: 36.9 (07-01-25 @ 20:54)  HR: 65 (07-02-25 @ 17:23) (65 - 80)  BP: 120/68 (07-02-25 @ 17:23) (92/54 - 120/68)  RR: 18 (07-02-25 @ 11:31) (18 - 18)  SpO2: 97% (07-02-25 @ 11:31) (97% - 99%)  Wt(kg): --    PAST MEDICAL & SURGICAL HISTORY  Hypertension    Hyperlipidemia    CVA (cerebral vascular accident)    Afib    No significant past surgical history        SOCIAL HISTORY - as per documentation/history  Smoking - None  EtOH - None  Drugs - None    FUNCTIONAL HISTORY  She lives with  in first floor apt, no steps to enter. PTA pt required assist with some ADL (bathing, LB dressing), ambulated with cane. Pt owns walkin shower with shower chair.      CURRENT FUNCTIONAL STATUS -7/2  Bed Mobility: mod A (7/2) vs. min A to mod A (6/30)  Transfers:  max A (7/2, 6/30)  Ambulation: max A   ADLS:   - LBD: Max A  - UPD: Mod A    FAMILY HISTORY   No pertinent family history in first degree relatives        RECENT LABS/IMAGING  CBC Full  -  ( 02 Jul 2025 07:26 )  WBC Count : 7.71 K/uL  RBC Count : 3.86 M/uL  Hemoglobin : 11.7 g/dL  Hematocrit : 36.6 %  Platelet Count - Automated : 216 K/uL  Mean Cell Volume : 94.8 fl  Mean Cell Hemoglobin : 30.3 pg  Mean Cell Hemoglobin Concentration : 32.0 g/dL  Auto Neutrophil # : x  Auto Lymphocyte # : x  Auto Monocyte # : x  Auto Eosinophil # : x  Auto Basophil # : x  Auto Neutrophil % : x  Auto Lymphocyte % : x  Auto Monocyte % : x  Auto Eosinophil % : x  Auto Basophil % : x    07-02    140  |  101  |  11  ----------------------------<  105[H]  3.7   |  25  |  0.82    Ca    8.7      02 Jul 2025 07:27      Urinalysis Basic - ( 02 Jul 2025 07:27 )    Color: x / Appearance: x / SG: x / pH: x  Gluc: 105 mg/dL / Ketone: x  / Bili: x / Urobili: x   Blood: x / Protein: x / Nitrite: x   Leuk Esterase: x / RBC: x / WBC x   Sq Epi: x / Non Sq Epi: x / Bacteria: x        ALLERGIES  shellfish (Unknown)  No Known Drug Allergies      MEDICATIONS   acetaminophen     Tablet .. 650 milliGRAM(s) Oral once  acetaminophen     Tablet .. 650 milliGRAM(s) Oral every 6 hours PRN  apixaban 2.5 milliGRAM(s) Oral every 12 hours  gabapentin 100 milliGRAM(s) Oral three times a day  lactated ringers. 1000 milliLiter(s) IV Continuous <Continuous>  lidocaine   4% Patch 1 Patch Transdermal daily  metoprolol tartrate 25 milliGRAM(s) Oral two times a day  mirtazapine 15 milliGRAM(s) Oral at bedtime  piperacillin/tazobactam IVPB.. 3.375 Gram(s) IV Intermittent every 8 hours  polyethylene glycol 3350 17 Gram(s) Oral daily  rosuvastatin 20 milliGRAM(s) Oral at bedtime  senna 2 Tablet(s) Oral at bedtime    ----------------------------------------------------------------------------------------  PHYSICAL EXAM  Constitutional - NAD, Comfortable  HEENT - NCAT, EOMI  Neck - Supple, No limited ROM  Chest - Breathing comfortably, No wheezing  Cardiovascular - S1S2   Abdomen - Soft   Extremities - No C/C/E, No calf tenderness   Neurologic Exam -                    Cognitive - Awake, Alert, AAO to self, place, date, year, situation     Communication - Fluent, very mild dysarthria.     Cranial Nerves - right facial droop, mild     Motor -                     LEFT    UE - ShAB 5/5, EF 5/5, EE 5/5, WE 5/5,  5/5                    RIGHT UE - ShAB 4/5, EF 4/5, EE 4/5, WE 3/5,  3/5                    LEFT    LE - HF 5/5, KE 5/5, DF 5/5, PF 5/5                    RIGHT LE - HF 4/5, KE 4/5, DF 4/5, PF 4/5        Sensory - Intact to LT     Coordination - impaired right     Balance - Good Static, Fair Dynamic  Psychiatric - Mood stable, Affect WNL  ----------------------------------------------------------------------------------------    ASSESSMENT/PLAN  89y Female with functional deficits after LEFT MCA Infarct and chronic occipital and right thalamic infarct    Left MCA infarct - eliquis 2.5mg, crestor 20mg  Pain - Tylenol  DVT PPX - SCDs  Rehab -   Currently recommending [facility], patient DOES NOT meet acute inpatient rehabilitation criteria. Patient needs a more prolonged stay to achieve transition to community.   Discussed this recommendation with daughter and patient. Per daughter, the patient did worse w/ PT and OT today compared to other days because she needed to have a bowel movement. I told her that we can try to see how she does w/ PT and OT tomorrow, but unless the patient demonstrates greater functional ability, then she will likely not qualify.

## 2025-07-03 PROCEDURE — G0545: CPT

## 2025-07-03 PROCEDURE — 99232 SBSQ HOSP IP/OBS MODERATE 35: CPT

## 2025-07-03 RX ADMIN — APIXABAN 2.5 MILLIGRAM(S): 2.5 TABLET, FILM COATED ORAL at 21:07

## 2025-07-03 RX ADMIN — APIXABAN 2.5 MILLIGRAM(S): 2.5 TABLET, FILM COATED ORAL at 08:44

## 2025-07-03 RX ADMIN — ROSUVASTATIN CALCIUM 20 MILLIGRAM(S): 5 TABLET, FILM COATED ORAL at 21:07

## 2025-07-03 RX ADMIN — Medication 25 GRAM(S): at 17:01

## 2025-07-03 RX ADMIN — Medication 25 GRAM(S): at 08:46

## 2025-07-03 RX ADMIN — METOPROLOL SUCCINATE 25 MILLIGRAM(S): 50 TABLET, EXTENDED RELEASE ORAL at 17:00

## 2025-07-03 RX ADMIN — GABAPENTIN 100 MILLIGRAM(S): 400 CAPSULE ORAL at 21:08

## 2025-07-03 RX ADMIN — MIRTAZAPINE 15 MILLIGRAM(S): 30 TABLET, FILM COATED ORAL at 21:07

## 2025-07-03 RX ADMIN — GABAPENTIN 100 MILLIGRAM(S): 400 CAPSULE ORAL at 12:55

## 2025-07-03 RX ADMIN — GABAPENTIN 100 MILLIGRAM(S): 400 CAPSULE ORAL at 05:22

## 2025-07-03 RX ADMIN — METOPROLOL SUCCINATE 25 MILLIGRAM(S): 50 TABLET, EXTENDED RELEASE ORAL at 05:23

## 2025-07-03 NOTE — PROGRESS NOTE ADULT - SUBJECTIVE AND OBJECTIVE BOX
CARDIOLOGY FOLLOW UP NOTE - DR. OLIVER    Patient Name: KYLIE BENJAMIN    Date of Service: 07-03-25 @ 11:38    Patient seen and examined    Subjective:    cv: denies chest pain, dyspnea, palpitations, dizziness  pulmonary: denies cough  GI: denies abdominal pain, nausea, vomiting  vascular/legs: no edema   skin: no rash  ROS: otherwise negative   overnight events:      PHYSICAL EXAM:  T(C): 36.7 (07-03-25 @ 11:09), Max: 36.8 (07-02-25 @ 20:34)  HR: 76 (07-03-25 @ 11:09) (65 - 80)  BP: 146/81 (07-03-25 @ 11:09) (104/56 - 146/81)  RR: 18 (07-03-25 @ 11:09) (18 - 18)  SpO2: 96% (07-03-25 @ 11:09) (94% - 97%)  Wt(kg): --  I&O's Summary    02 Jul 2025 07:01  -  03 Jul 2025 07:00  --------------------------------------------------------  IN: 560 mL / OUT: 1100 mL / NET: -540 mL      Daily     Daily     Appearance: Normal	  Cardiovascular: Normal S1 S2,RRR, No JVD, No murmurs  Respiratory: Lungs clear to auscultation	  Gastrointestinal:  Soft, Non-tender, + BS	  Extremities: Normal range of motion, No clubbing, cyanosis or edema      Home Medications:  Eliquis 2.5 mg oral tablet: 1 tab(s) orally 2 times a day (24 Jun 2025 18:25)  metoprolol succinate 100 mg oral tablet, extended release: 1 tab(s) orally once a day (24 Jun 2025 18:25)  mirtazapine 15 mg oral tablet: 1 tab(s) orally once a day (at bedtime) (24 Jun 2025 18:25)  Multiple Vitamins oral tablet: 1 tab(s) orally once a day (24 Jun 2025 18:58)  olmesartan 40 mg oral tablet: 1 tab(s) orally once a day (24 Jun 2025 18:25)  Potassium Chloride (Eqv-Klor-Con M20) 20 mEq oral tablet, extended release: 1 tab(s) orally once a day (with meals) (24 Jun 2025 18:33)  rosuvastatin 20 mg oral tablet: 1 tab(s) orally once a day (24 Jun 2025 18:25)  triamcinolone 0.1% topical lotion: Apply topically to affected area once a day as needed for  rash (24 Jun 2025 18:25)      MEDICATIONS  (STANDING):  acetaminophen     Tablet .. 650 milliGRAM(s) Oral once  apixaban 2.5 milliGRAM(s) Oral every 12 hours  gabapentin 100 milliGRAM(s) Oral three times a day  lactated ringers. 1000 milliLiter(s) (100 mL/Hr) IV Continuous <Continuous>  lidocaine   4% Patch 1 Patch Transdermal daily  metoprolol tartrate 25 milliGRAM(s) Oral two times a day  mirtazapine 15 milliGRAM(s) Oral at bedtime  piperacillin/tazobactam IVPB.. 3.375 Gram(s) IV Intermittent every 8 hours  polyethylene glycol 3350 17 Gram(s) Oral daily  rosuvastatin 20 milliGRAM(s) Oral at bedtime  senna 2 Tablet(s) Oral at bedtime      TELEMETRY: 	    ECG:  	  RADIOLOGY:   DIAGNOSTIC TESTING:  [ ] Echocardiogram:  [ ] Catheterization:  [ ] Stress Test:    OTHER: 	    LABS:	 	    CARDIAC MARKERS:                                      11.7   7.71  )-----------( 216      ( 02 Jul 2025 07:26 )             36.6     07-02    140  |  101  |  11  ----------------------------<  105[H]  3.7   |  25  |  0.82    Ca    8.7      02 Jul 2025 07:27      proBNP:     Lipid Profile:   HgA1c:     Creatinine: 0.82 mg/dL (07-02-25 @ 07:27)  Creatinine: 0.79 mg/dL (07-01-25 @ 05:53)

## 2025-07-03 NOTE — PROGRESS NOTE ADULT - SUBJECTIVE AND OBJECTIVE BOX
KYLIE BENJAMIN  89y Female  MRN:[id number]    Patient is a 89y old  Female who presents with a chief complaint of slurred speech    HPI:   89 yo female, with PMHx of HTN, HLD, prior stroke in 2015 with residual left eye vision loss (bilateral occipital lobes and right posteromedial temporal lobe, right thalamic lacunar, right basal ganglia, right paramedian midbrain stroke), Afib (on Eliquis), cognitive decline (follows with Dr. Argueta), BIBEMS due to inability to speak or ambulate. LKW at around 3 AM on 6/24/2025. Daughter at bedside reports that she noted that patient was only making grunting sounds in response to conversation and appeared to have difficulty recognizing her daughter when she saw patient on 6/24/2025 at around 9 AM. However, daughter reports that patient has now returned to baseline. Of note, daughter stated that patient was reporting abdominal pain on 6/23/2025. Ambulates using a cane at baseline, requires assistance with eating and bathing. Reports adherence to Eliquis currently but reportedly had not been taking Eliquis for about 1 month for unknown reasons. Translation from Cantonese provided by daughter at bedside per patient and family preference.  (24 Jun 2025 18:58)      Patient seen and evaluated at bedside. No acute events overnight except as noted.    Interval HPI: no acute events o/n    PAST MEDICAL & SURGICAL HISTORY:  Hypertension      Hyperlipidemia      CVA (cerebral vascular accident)      Afib      No significant past surgical history          REVIEW OF SYSTEMS:  as per hpi       VITALS:    Vital Signs Last 24 Hrs  T(C): 36.7 (03 Jul 2025 11:09), Max: 36.8 (02 Jul 2025 20:34)  T(F): 98 (03 Jul 2025 11:09), Max: 98.2 (02 Jul 2025 20:34)  HR: 76 (03 Jul 2025 11:09) (65 - 80)  BP: 146/81 (03 Jul 2025 11:09) (104/56 - 146/81)  BP(mean): --  RR: 18 (03 Jul 2025 11:09) (18 - 18)  SpO2: 96% (03 Jul 2025 11:09) (94% - 97%)    Parameters below as of 03 Jul 2025 11:09  Patient On (Oxygen Delivery Method): nasal cannula  O2 Flow (L/min): 1          PHYSICAL EXAM:  GENERAL: NAD, well-developed, lethargic   HEAD:  Atraumatic, Normocephalic  EYES: EOMI, PERRLA, conjunctiva and sclera clear  NECK: Supple, No JVD  CHEST/LUNG: Clear to auscultation bilaterally; No wheeze  HEART: S1, S2; No murmurs, rubs, or gallops  ABDOMEN: Soft, Nontender, Nondistended; Bowel sounds present  EXTREMITIES:  2+ Peripheral Pulses, No clubbing, cyanosis, or edema  NEUROLOGY: AAOX3; +dysarthria   SKIN: No rashes or lesions    Consultant(s) Notes Reviewed:  [x ] YES  [ ] NO  Care Discussed with Consultants/Other Providers [ x] YES  [ ] NO    MEDS:   MEDICATIONS  (STANDING):  acetaminophen     Tablet .. 650 milliGRAM(s) Oral once  apixaban 2.5 milliGRAM(s) Oral every 12 hours  gabapentin 100 milliGRAM(s) Oral three times a day  lactated ringers. 1000 milliLiter(s) (100 mL/Hr) IV Continuous <Continuous>  lidocaine   4% Patch 1 Patch Transdermal daily  metoprolol tartrate 25 milliGRAM(s) Oral two times a day  mirtazapine 15 milliGRAM(s) Oral at bedtime  piperacillin/tazobactam IVPB.. 3.375 Gram(s) IV Intermittent every 8 hours  polyethylene glycol 3350 17 Gram(s) Oral daily  rosuvastatin 20 milliGRAM(s) Oral at bedtime  senna 2 Tablet(s) Oral at bedtime    MEDICATIONS  (PRN):  acetaminophen     Tablet .. 650 milliGRAM(s) Oral every 6 hours PRN Moderate Pain (4 - 6)    ALLERGIES:  shellfish (Unknown)  No Known Drug Allergies      LABS:                                                                    11.7   7.71  )-----------( 216      ( 02 Jul 2025 07:26 )             36.6   07-02    140  |  101  |  11  ----------------------------<  105[H]  3.7   |  25  |  0.82    Ca    8.7      02 Jul 2025 07:27            < from: CT Chest w/ IV Cont (06.29.25 @ 22:59) >  IMPRESSION:  Prominent distention of the rectum with stool, in keeping with the   clinical history of constipation.    Trace bilateral pleural effusions with adjacent passive atelectasis.   Otherwiseclear lungs.  .    --- End of Report ---    < end of copied text >        < from: CT Head No Cont (06.29.25 @ 13:35) >    IMPRESSION:  Small acute to subacute infarct in the left frontal cortex seen better on   MRI 6/25/2025. No hemorrhage.  Chronic bilateral occipital infarcts and a right thalamic lacunar infarct   is again noted.      < end of copied text >                 < from: [facility] Duplex Lower Ext Vein Scan, Bilat (06.27.25 @ 18:09) >  IMPRESSION:  No evidence of deep venous thrombosis in either lower extremity.        < end of copied text >                                   < from: TTE Limited W or WO Ultrasound Enhancing Agent (06.26.25 @ 14:40) >  _______________________________________________________________________________________     CONCLUSIONS:      1. Left ventricular cavity is small. Left ventricular wall thickness is normal. Left ventricular systolic function is normal with an ejection fraction of 57 % by Denson's method of disks. There are no regional wall motion abnormalities seen.   2. Normal right ventricular cavity size, with normal wall thickness, and normal right ventricular systolic function. Tricuspid annular plane systolic excursion (TAPSE) is 2.0 cm (normal >=1.7 cm).   3. Left atrium is mildly dilated.   4. The right atrium is mildly dilated.   5. No pericardial effusion seen.   6. No prior echocardiogram is available for comparison.   7. Agitated saline injection was negative for intracardiac shunt.   8. Severe tricuspid regurgitation.   9. There is normal LV mass and concentric remodeling.    ________________________________________________________________________________________    < end of copied text >      < from: MR Head No Cont (06.25.25 @ 20:50) >    IMPRESSION: Acute left MCA territory infarctions    --- End of Report ---        < end of copied text >           < from: CT Angio Neck Stroke Protocol w/ IV Cont (06.24.25 @ 10:18) >  IMPRESSION:    CT PERFUSION: No perfusion deficit.    CTA NECK AND HEAD: No large vessel occlusion or major stenosis.    --- End of Report ---      < end of copied text >

## 2025-07-03 NOTE — PROGRESS NOTE ADULT - ASSESSMENT
A/P    89 female h/o cva, htn, chol, afib on eliquis, here with r/o cva    #r/o CVA  -w/u per medicine   -neuro f/u    #AF  -stable, in sr  -cont metoprolol bid   -echo with normal lv fxn  -cont a/c    #fever, hypotension  -infectious w/u pe\r med  -abx per med  -f/u bcx      58 minutes spent on total encounter; more than 50% of the visit was spent counseling and/or coordinating care by the attending physician.

## 2025-07-03 NOTE — PROGRESS NOTE ADULT - SUBJECTIVE AND OBJECTIVE BOX
Neurology      S: patient seen. doing okay       Medications: MEDICATIONS  (STANDING):  acetaminophen     Tablet .. 650 milliGRAM(s) Oral once  apixaban 2.5 milliGRAM(s) Oral every 12 hours  gabapentin 100 milliGRAM(s) Oral three times a day  lactated ringers. 1000 milliLiter(s) (100 mL/Hr) IV Continuous <Continuous>  lidocaine   4% Patch 1 Patch Transdermal daily  metoprolol tartrate 25 milliGRAM(s) Oral two times a day  mirtazapine 15 milliGRAM(s) Oral at bedtime  piperacillin/tazobactam IVPB.. 3.375 Gram(s) IV Intermittent every 8 hours  polyethylene glycol 3350 17 Gram(s) Oral daily  rosuvastatin 20 milliGRAM(s) Oral at bedtime  senna 2 Tablet(s) Oral at bedtime    MEDICATIONS  (PRN):  acetaminophen     Tablet .. 650 milliGRAM(s) Oral every 6 hours PRN Moderate Pain (4 - 6)       Vitals:  Vital Signs Last 24 Hrs  T(C): 36.7 (03 Jul 2025 11:09), Max: 36.8 (02 Jul 2025 20:34)  T(F): 98 (03 Jul 2025 11:09), Max: 98.2 (02 Jul 2025 20:34)  HR: 76 (03 Jul 2025 11:09) (76 - 80)  BP: 146/81 (03 Jul 2025 11:09) (104/56 - 146/81)  BP(mean): --  RR: 18 (03 Jul 2025 11:09) (18 - 18)  SpO2: 96% (03 Jul 2025 11:09) (94% - 97%)    Parameters below as of 03 Jul 2025 11:09  Patient On (Oxygen Delivery Method): nasal cannula  O2 Flow (L/min): 1           General Exam:   General Appearance: Appropriately dressed and in no acute distress       Head: Normocephalic, atraumatic and no dysmorphic features  Ear, Nose, and Throat: Moist mucous membranes  CVS: S1S2+  Resp: No SOB, no wheeze or rhonchi  GI: soft NT/ND  Extremities: No edema or cyanosis  Skin: No bruises or rashes     Neurological Exam:  MS - AAO x2, speech fluent, rep/naming intact, follows commands  CN - PERRL, visual fields intact in right eye, unable to test if visual fields are intact in left eye as patient is unable to keep her eye open for testing, EOMI, face sensation (V1-V3) intact b/l, facial strength intact without asymmetry b/l, hearing intact b/l, palate with symmetric elevation, trapezius 5/5 strength b/l, tongue midline on protrusion with full lateral movement  Motor - Normal bulk/tone, strength 5/5 entire L side, RUE 3/5, RLE 4/5, RUE drift, pronator drift. R wrist drop  Sens - LT intact all  DTR's - Neutral b/l plantar response  Coord - Coordination grossly intact for level of strength, no tremors appreciated  Gait and station - Due to fall risk/safety concerns did not assess    Data/Labs/Imaging which I personally reviewed.       LABS:  no new labs                         11.7   7.71  )-----------( 216      ( 02 Jul 2025 07:26 )             36.6     07-02    140  |  101  |  11  ----------------------------<  105[H]  3.7   |  25  |  0.82    Ca    8.7      02 Jul 2025 07:27          Urinalysis Basic - ( 02 Jul 2025 07:27 )    Color: x / Appearance: x / SG: x / pH: x  Gluc: 105 mg/dL / Ketone: x  / Bili: x / Urobili: x   Blood: x / Protein: x / Nitrite: x   Leuk Esterase: x / RBC: x / WBC x   Sq Epi: x / Non Sq Epi: x / Bacteria: x                CT PERFUSION: No perfusion deficit.    CTA NECK AND HEAD: No large vessel occlusion or major stenosis.    --- End of Report ---    < end of copied text >    < from: CT Brain Stroke Protocol (06.24.25 @ 10:16) >    FINDINGS: There is no acute intracranial hemorrhage, mass effect, shift   of the midline structures, herniation, extra-axial fluid collection, or   hydrocephalus.    Chronic infarcts are seen within the bilateral occipital lobes and right   posteromedial temporal lobe. There is also a chronic lacunar infarct   within the right thalamus and also within the right basal ganglia. A   chronic infarct within the right paramedian midbrain is seen.    There is mild diffuse cerebral volume loss with prominence of the sulci,   fissures, and cisternal spaces which is normal for the patient's age.   There is mild deep and periventricular white matter hypoattenuation   statistically compatible with microvascular changes given calcific   atherosclerotic disease of the intracranial arteries.    Minor scattered mucosal thickening is seen throughout the paranasal   sinuses. The tympanomastoid cavities are clear. The calvarium is intact.   There is evidence of bilateral cataract removal.    IMPRESSION: No acute intracranial hemorrhage, mass effect, or shift of   the midline structures.    Multiple additional unchanged chronic intracranial findings, as discussed.    --- End of Report ---    < end of copied text >    IMPRESSION: Acute left MCA territory infarctions    IMPRESSION: R. hemiparesis likely due to L. MCA area infarct. Mechanism pending further workup.

## 2025-07-03 NOTE — PROGRESS NOTE ADULT - SUBJECTIVE AND OBJECTIVE BOX
Follow Up:    AMS    Interval History/ROS:  VSS. Patient was seen and examined at bedside. Daughter at bedside. No new fever, cough, or AMS.     Allergies  shellfish (Unknown)  No Known Drug Allergies        ANTIMICROBIALS:  piperacillin/tazobactam IVPB.. 3.375 every 8 hours      OTHER MEDS:  MEDICATIONS  (STANDING):  acetaminophen     Tablet .. 650 every 6 hours PRN  acetaminophen     Tablet .. 650 once  apixaban 2.5 every 12 hours  gabapentin 100 three times a day  metoprolol tartrate 25 two times a day  mirtazapine 15 at bedtime  polyethylene glycol 3350 17 daily  rosuvastatin 20 at bedtime  senna 2 at bedtime      Vital Signs Last 24 Hrs  T(C): 36.7 (03 Jul 2025 11:09), Max: 36.8 (02 Jul 2025 20:34)  T(F): 98 (03 Jul 2025 11:09), Max: 98.2 (02 Jul 2025 20:34)  HR: 76 (03 Jul 2025 11:09) (65 - 80)  BP: 146/81 (03 Jul 2025 11:09) (104/56 - 146/81)  BP(mean): --  RR: 18 (03 Jul 2025 11:09) (18 - 18)  SpO2: 96% (03 Jul 2025 11:09) (94% - 97%)    Parameters below as of 03 Jul 2025 11:09  Patient On (Oxygen Delivery Method): nasal cannula  O2 Flow (L/min): 1      PHYSICAL EXAM:  Constitutional: non-toxic, no distress  ENT:  supple, no thrush  Cardiovascular:   normal S1, S2, no murmur, RRR  Respiratory:  clear BS bilaterally, no wheezes, no rales  GI:  soft  Neurologic: awake and alert  Skin:  no rash, no phlebitis  Psychiatric:  awake, alert, appropriate mood                              11.7   7.71  )-----------( 216      ( 02 Jul 2025 07:26 )             36.6       07-02    140  |  101  |  11  ----------------------------<  105[H]  3.7   |  25  |  0.82    Ca    8.7      02 Jul 2025 07:27        Urinalysis Basic - ( 02 Jul 2025 07:27 )    Color: x / Appearance: x / SG: x / pH: x  Gluc: 105 mg/dL / Ketone: x  / Bili: x / Urobili: x   Blood: x / Protein: x / Nitrite: x   Leuk Esterase: x / RBC: x / WBC x   Sq Epi: x / Non Sq Epi: x / Bacteria: x        MICROBIOLOGY:  v  Clean Catch Clean Catch (Midstream)  06-29-25   <10,000 CFU/mL Normal Urogenital Nahed  --  --      Blood Blood  06-29-25   No growth at 4 days  --  --      Blood Blood  06-29-25   No growth at 4 days  --  --      Clean Catch  06-24-25   >100,000 CFU/ml Escherichia coli  --  Escherichia coli                RADIOLOGY:  Imaging below independently reviewed.  < from: CT Chest w/ IV Cont (06.29.25 @ 22:59) >    ACC: [id number] EXAM:  CT ABDOMEN AND PELVIS OC IC   ORDERED BY:  CARLITOS URENA     ACC: [id number] EXAM:  CT CHEST IC   ORDERED BY:  CARLITOS URENA     PROCEDURE DATE:  06/29/2025          INTERPRETATION:  CLINICAL INFORMATION: Nonverbal. Evaluate for   constipation per team.    COMPARISON: None.    CONTRAST/COMPLICATIONS:  IV Contrast: Omnipaque 350  90 cc administered   10 cc discarded  Oral Contrast: Omnipaque 300    PROCEDURE:  CT of the Chest, Abdomen and Pelvis was performed.  Sagittal and coronal reformats were performed.    FINDINGS:  CHEST:  LUNGS AND LARGE AIRWAYS: Patent central airways. Passive atelectasis at   the lung bases.  PLEURA: Trace bilateral pleural effusions.  VESSELS: Atherosclerotic calcifications.  HEART: Cardiomegaly. No pericardial effusion.  MEDIASTINUM AND MARIANNE: No lymphadenopathy.  CHEST WALL AND LOWER NECK: Left thyroidectomy.    ABDOMEN AND PELVIS:  LIVER: Indeterminate left hepatic lobe lesion with small peripheral   calcification measuring 1.4 cm. Subcentimeter lesions too small to   characterize.  BILE DUCTS: Normal caliber.  GALLBLADDER: Within normal limits.  SPLEEN: Within normal limits.  PANCREAS: Within normal limits.  ADRENALS: Within normal limits.  KIDNEYS/URETERS: Right renal cyst measuring 3.4 cm. Additional lesions   too small to characterize.    BLADDER: Within normal limits.  REPRODUCTIVE ORGANS: This bilateral adnexa within normal limits.    BOWEL: No bowel obstruction. Appendix is normal. Diverticulosis, without   evidence of acute diverticulitis. Prominent distention of the rectum with   stool.  PERITONEUM/RETROPERITONEUM: No pneumoperitoneum or drainable fluid   collection.  VESSELS: Atherosclerotic calcifications.  LYMPH NODES: No lymphadenopathy.  ABDOMINAL WALL: Mild presacral edema.  BONES: Within normal limits.    IMPRESSION:  Prominent distention of the rectum with stool, in keeping with the   clinical history of constipation.    Trace bilateral pleural effusions with adjacent passive atelectasis.   Otherwiseclear lungs.  .    --- End of Report ---          CHING VALVERDE MD; Resident Radiologist  This document has been electronically signed.  JESSICA BERRIOS MD; Attending Radiologist  This document has been electronically signed. Jun 30 2025  7:31AM    < end of copied text >

## 2025-07-03 NOTE — PROGRESS NOTE ADULT - ASSESSMENT
88 yo female, with  HTN, HLD, prior stroke in 2015 with residual left eye vision loss (bilateral occipital lobes and right posteromedial temporal lobe, right thalamic lacunar, right basal ganglia, right paramedian midbrain stroke), Afib (on Eliquis), cognitive decline (follows with Dr. Argueta), BIBEMS due to inability to speak or ambulate. Reports adherence to Eliquis currently but reportedly had not been taking Eliquis for about 1 month for unknown reasons.    On exam, entire R side notably weaker than L, RUE>RLE, + RUE drift. Pending MRI brain to r/o new infarct.  NIHSS ~15 premrs 2  CTA H/N no LVO   A1c 6.1  LDL 51   MRI brain acute L MCA infarct   TTE EF 57% no vegetation   CTH 6/29: subacute L freontal infarct . chornic bilateral occipital and R thalalmic infarct     Imprssion:   Stroke, possible related to non adhearance to DOAC ; multiple chronic strokes as well   AMS in setting of infection  MCI/dementia     - c/w elqiuis 2.5mg BID  - rosuvstatin 20mg QHS  - infectious workup on zosyn    - telemetry  - PT/OT/SS/SLP, OOBC   - check FS, glucose control <180  - GI/DVT ppx  - Thank you for allowing me to participate in the care of this patient. Call with questions.   dc planning KOFI Cordero MD  Vascular Neurology  Office: [phone]

## 2025-07-03 NOTE — PROGRESS NOTE ADULT - ASSESSMENT
89-yo F w/ PMH of CVA (2015) w/ residual L vision loss, afib on Eliquis, and HTN, admitted for inability to speak or ambulate. Acute L MCA territory infarction noted. UA 6/24 unremarkable. UCx w/ >100k CFU/mL pansensitive E coli, s/p ceftriaxone 6/24-28. RRT on 6/29 for unresponsiveness, found to be hypotensive and febrile to 101. Started on vancomycin and Zosyn. Need for infectious workup. Aspiration should be included in differential.    Mental status greatly improved the next day. CT C/A/P w/ clear lungs and large stool load. Procal elevated compared to prior. Possibly aspiration pneumonitis?  MRSA swab neg.    #AMS  #CVA  #Fever  #Hypotension  #Debility  #At risk for aspiration    Recommendations:  - F/u BCx and UCx   - Complete Zosyn 3.375 g IV Q8H after today's doses.    - Monitor WBC and VS  - F/u Neuro  - Bed head elevation. Aspiration precautions.    Topics relating to disease transmission risk assessment and mitigation, complex antimicrobial therapy counseling and treatment, and/or public health investigation, analysis, and testing were addressed.  Plan discussed with primary team clinician.    Thank you for this consult. Inpatient ID consult team will discontinue active follow-up for this patient.  Further changes in lab values, imaging studies, or clinical status will not be known to ID inpatient consultants unless specifically communicated by primary team.        Gigi Miller MD, PhD  Attending Physician  [facility] Division of Infectious Diseases  Department of Medicine    Please contact through MS Teams message.  Office: [phone] (after 5 PM or weekend)

## 2025-07-03 NOTE — PROGRESS NOTE ADULT - ASSESSMENT
89 female h/o cva, htn, chol, afib on eliquis, here with r/o cva     cva  neuro consult f/u  CT noted   MRI noted   tte noted  serial neuro exams  eliquis/statin  neuro f/u noted  PT/OT     uti  s/p ceftriaxone     afib  AC with eliquis  rate control  BB resumed   cards f/u    lower ext pain  f/u dopplers r/o dvt - neg  trial of gabapentin     new fever and hypotension  abx changed to zosyn  f/u repeat cult  f/u CT c/a/p - no acute pathology   ID consult f/u   ID f/u re abx plan  to finish abx today    constipation  bowel regimen     cont other home meds      PT    d/w daughter bedside     dc planning rehab    I reviewed the course of events on the unit, re-confirming the patient history.   I discussed the care with the patient and their family. The plan of care was discussed with the ACP team.  Advanced care planning was discussed with patient and family.  Advanced care planning forms were reviewed and discussed as appropriate.  Differential diagnosis and plan of care discussed with patient after the evaluation.   Pain assessed and judicious use of narcotics when appropriate was discussed.  Importance of Fall prevention discussed.  Counseling on Smoking and Alcohol cessation was offered when appropriate.  Counseling on Diet, exercise, and medication compliance was done.

## 2025-07-04 ENCOUNTER — TRANSCRIPTION ENCOUNTER (OUTPATIENT)
Age: 89
End: 2025-07-04

## 2025-07-04 VITALS
DIASTOLIC BLOOD PRESSURE: 74 MMHG | OXYGEN SATURATION: 97 % | SYSTOLIC BLOOD PRESSURE: 120 MMHG | RESPIRATION RATE: 18 BRPM | HEART RATE: 74 BPM | TEMPERATURE: 98 F

## 2025-07-04 LAB
CULTURE RESULTS: SIGNIFICANT CHANGE UP
CULTURE RESULTS: SIGNIFICANT CHANGE UP
SPECIMEN SOURCE: SIGNIFICANT CHANGE UP
SPECIMEN SOURCE: SIGNIFICANT CHANGE UP

## 2025-07-04 PROCEDURE — 84484 ASSAY OF TROPONIN QUANT: CPT

## 2025-07-04 PROCEDURE — 70498 CT ANGIOGRAPHY NECK: CPT

## 2025-07-04 PROCEDURE — 87637 SARSCOV2&INF A&B&RSV AMP PRB: CPT

## 2025-07-04 PROCEDURE — 96374 THER/PROPH/DIAG INJ IV PUSH: CPT

## 2025-07-04 PROCEDURE — 83735 ASSAY OF MAGNESIUM: CPT

## 2025-07-04 PROCEDURE — 85018 HEMOGLOBIN: CPT

## 2025-07-04 PROCEDURE — 84132 ASSAY OF SERUM POTASSIUM: CPT

## 2025-07-04 PROCEDURE — 97110 THERAPEUTIC EXERCISES: CPT

## 2025-07-04 PROCEDURE — 74177 CT ABD & PELVIS W/CONTRAST: CPT

## 2025-07-04 PROCEDURE — 82435 ASSAY OF BLOOD CHLORIDE: CPT

## 2025-07-04 PROCEDURE — 93005 ELECTROCARDIOGRAM TRACING: CPT

## 2025-07-04 PROCEDURE — 87086 URINE CULTURE/COLONY COUNT: CPT

## 2025-07-04 PROCEDURE — 99285 EMERGENCY DEPT VISIT HI MDM: CPT

## 2025-07-04 PROCEDURE — 80053 COMPREHEN METABOLIC PANEL: CPT

## 2025-07-04 PROCEDURE — 70450 CT HEAD/BRAIN W/O DYE: CPT

## 2025-07-04 PROCEDURE — 70551 MRI BRAIN STEM W/O DYE: CPT

## 2025-07-04 PROCEDURE — 97530 THERAPEUTIC ACTIVITIES: CPT

## 2025-07-04 PROCEDURE — 87186 SC STD MICRODIL/AGAR DIL: CPT

## 2025-07-04 PROCEDURE — 92610 EVALUATE SWALLOWING FUNCTION: CPT

## 2025-07-04 PROCEDURE — 71260 CT THORAX DX C+: CPT

## 2025-07-04 PROCEDURE — 81001 URINALYSIS AUTO W/SCOPE: CPT

## 2025-07-04 PROCEDURE — 80061 LIPID PANEL: CPT

## 2025-07-04 PROCEDURE — 85025 COMPLETE CBC W/AUTO DIFF WBC: CPT

## 2025-07-04 PROCEDURE — 97535 SELF CARE MNGMENT TRAINING: CPT

## 2025-07-04 PROCEDURE — 70496 CT ANGIOGRAPHY HEAD: CPT

## 2025-07-04 PROCEDURE — 87040 BLOOD CULTURE FOR BACTERIA: CPT

## 2025-07-04 PROCEDURE — 71045 X-RAY EXAM CHEST 1 VIEW: CPT

## 2025-07-04 PROCEDURE — 80048 BASIC METABOLIC PNL TOTAL CA: CPT

## 2025-07-04 PROCEDURE — 82330 ASSAY OF CALCIUM: CPT

## 2025-07-04 PROCEDURE — 87641 MR-STAPH DNA AMP PROBE: CPT

## 2025-07-04 PROCEDURE — 96375 TX/PRO/DX INJ NEW DRUG ADDON: CPT

## 2025-07-04 PROCEDURE — 85027 COMPLETE CBC AUTOMATED: CPT

## 2025-07-04 PROCEDURE — 83036 HEMOGLOBIN GLYCOSYLATED A1C: CPT

## 2025-07-04 PROCEDURE — 93306 TTE W/DOPPLER COMPLETE: CPT

## 2025-07-04 PROCEDURE — 36415 COLL VENOUS BLD VENIPUNCTURE: CPT

## 2025-07-04 PROCEDURE — 97162 PT EVAL MOD COMPLEX 30 MIN: CPT

## 2025-07-04 PROCEDURE — 85730 THROMBOPLASTIN TIME PARTIAL: CPT

## 2025-07-04 PROCEDURE — 85610 PROTHROMBIN TIME: CPT

## 2025-07-04 PROCEDURE — 80202 ASSAY OF VANCOMYCIN: CPT

## 2025-07-04 PROCEDURE — 84295 ASSAY OF SERUM SODIUM: CPT

## 2025-07-04 PROCEDURE — 87640 STAPH A DNA AMP PROBE: CPT

## 2025-07-04 PROCEDURE — 82947 ASSAY GLUCOSE BLOOD QUANT: CPT

## 2025-07-04 PROCEDURE — 97112 NEUROMUSCULAR REEDUCATION: CPT

## 2025-07-04 PROCEDURE — 84100 ASSAY OF PHOSPHORUS: CPT

## 2025-07-04 PROCEDURE — 82803 BLOOD GASES ANY COMBINATION: CPT

## 2025-07-04 PROCEDURE — 83605 ASSAY OF LACTIC ACID: CPT

## 2025-07-04 PROCEDURE — 93970 EXTREMITY STUDY: CPT

## 2025-07-04 PROCEDURE — 97166 OT EVAL MOD COMPLEX 45 MIN: CPT

## 2025-07-04 PROCEDURE — 84145 PROCALCITONIN (PCT): CPT

## 2025-07-04 PROCEDURE — 0241U: CPT

## 2025-07-04 PROCEDURE — 0042T: CPT

## 2025-07-04 PROCEDURE — 82962 GLUCOSE BLOOD TEST: CPT

## 2025-07-04 PROCEDURE — 85014 HEMATOCRIT: CPT

## 2025-07-04 RX ORDER — METOPROLOL SUCCINATE 50 MG/1
1 TABLET, EXTENDED RELEASE ORAL
Refills: 0 | DISCHARGE

## 2025-07-04 RX ORDER — GABAPENTIN 400 MG/1
1 CAPSULE ORAL
Qty: 0 | Refills: 0 | DISCHARGE
Start: 2025-07-04

## 2025-07-04 RX ORDER — METOPROLOL SUCCINATE 50 MG/1
1 TABLET, EXTENDED RELEASE ORAL
Qty: 0 | Refills: 0 | DISCHARGE
Start: 2025-07-04

## 2025-07-04 RX ORDER — TRIAMCINOLONE ACETONIDE 1 MG/G
1 CREAM TOPICAL
Refills: 0 | DISCHARGE

## 2025-07-04 RX ORDER — POLYETHYLENE GLYCOL 3350 17 G/17G
17 POWDER, FOR SOLUTION ORAL
Qty: 0 | Refills: 0 | DISCHARGE
Start: 2025-07-04

## 2025-07-04 RX ORDER — ACETAMINOPHEN 500 MG/5ML
2 LIQUID (ML) ORAL
Qty: 0 | Refills: 0 | DISCHARGE
Start: 2025-07-04

## 2025-07-04 RX ORDER — SENNA 187 MG
2 TABLET ORAL
Qty: 0 | Refills: 0 | DISCHARGE
Start: 2025-07-04

## 2025-07-04 RX ORDER — LIDOCAINE HYDROCHLORIDE 20 MG/ML
1 JELLY TOPICAL
Qty: 0 | Refills: 0 | DISCHARGE
Start: 2025-07-04

## 2025-07-04 RX ADMIN — APIXABAN 2.5 MILLIGRAM(S): 2.5 TABLET, FILM COATED ORAL at 08:35

## 2025-07-04 RX ADMIN — POLYETHYLENE GLYCOL 3350 17 GRAM(S): 17 POWDER, FOR SOLUTION ORAL at 11:33

## 2025-07-04 RX ADMIN — GABAPENTIN 100 MILLIGRAM(S): 400 CAPSULE ORAL at 05:33

## 2025-07-04 RX ADMIN — Medication 650 MILLIGRAM(S): at 05:33

## 2025-07-04 RX ADMIN — LIDOCAINE HYDROCHLORIDE 1 PATCH: 20 JELLY TOPICAL at 11:33

## 2025-07-04 RX ADMIN — METOPROLOL SUCCINATE 25 MILLIGRAM(S): 50 TABLET, EXTENDED RELEASE ORAL at 05:33

## 2025-07-04 RX ADMIN — Medication 25 GRAM(S): at 01:04

## 2025-07-04 RX ADMIN — Medication 650 MILLIGRAM(S): at 07:27

## 2025-07-04 NOTE — PROGRESS NOTE ADULT - ASSESSMENT
88 yo female, with  HTN, HLD, prior stroke in 2015 with residual left eye vision loss (bilateral occipital lobes and right posteromedial temporal lobe, right thalamic lacunar, right basal ganglia, right paramedian midbrain stroke), Afib (on Eliquis), cognitive decline (follows with Dr. Argueta), BIBEMS due to inability to speak or ambulate. Reports adherence to Eliquis currently but reportedly had not been taking Eliquis for about 1 month for unknown reasons.    On exam, entire R side notably weaker than L, RUE>RLE, + RUE drift. Pending MRI brain to r/o new infarct.  NIHSS ~15 premrs 2  CTA H/N no LVO   A1c 6.1  LDL 51   MRI brain acute L MCA infarct   TTE EF 57% no vegetation   CTH 6/29: subacute L freontal infarct . chornic bilateral occipital and R thalalmic infarct     Imprssion:   Stroke, possible related to non adhearance to DOAC ; multiple chronic strokes as well   AMS in setting of infection  MCI/dementia     - c/w elqiuis 2.5mg BID  - rosuvstatin 20mg QHS  - infectious workup on zosyn    - telemetry  - PT/OT/SS/SLP, OOBC   - check FS, glucose control <180  - GI/DVT ppx  - Thank you for allowing me to participate in the care of this patient. Call with questions.   dc planning KOFI Cordero MD  Vascular Neurology  Office: [phone]

## 2025-07-04 NOTE — PROVIDER CONTACT NOTE (MEDICATION) - ASSESSMENT
AOX2. Cantonese speaking. Denies chest pain, palpations, SOB. RN with pt at while event. never lost consciousness. VSS.

## 2025-07-04 NOTE — DISCHARGE NOTE PROVIDER - CARE PROVIDER_API CALL
Mango Argueta  Neurology  [address]  [place] [address]  Phone: [phone]  Fax: [phone]  Follow Up Time:

## 2025-07-04 NOTE — PROGRESS NOTE ADULT - PROVIDER SPECIALTY LIST ADULT
Cardiology
Infectious Disease
Internal Medicine
Neurology
Infectious Disease
Internal Medicine
Neurology
Cardiology
Infectious Disease
Internal Medicine
Neurology

## 2025-07-04 NOTE — DISCHARGE NOTE NURSING/CASE MANAGEMENT/SOCIAL WORK - FINANCIAL ASSISTANCE
[facility] provides services at a reduced cost to those who are determined to be eligible through [facility]’s financial assistance program. Information regarding [facility]’s financial assistance program can be found by going to [email or web address] or by calling [phone].

## 2025-07-04 NOTE — DISCHARGE NOTE PROVIDER - DISCHARGE SERVICE FOR PATIENT
on the discharge service for the patient. I have reviewed and made amendments to the documentation where necessary.
[employer]

## 2025-07-04 NOTE — PROGRESS NOTE ADULT - SUBJECTIVE AND OBJECTIVE BOX
CARDIOLOGY FOLLOW UP NOTE - DR. OLIVER    Patient Name: KYLIE BENJAMIN    Date of Service: 07-04-25 @ 13:25    Patient seen and examined    Subjective:    cv: denies chest pain, dyspnea, palpitations, dizziness  pulmonary: denies cough  GI: denies abdominal pain, nausea, vomiting  vascular/legs: no edema   skin: no rash  ROS: otherwise negative   overnight events:      PHYSICAL EXAM:  T(C): 36.8 (07-04-25 @ 12:04), Max: 36.8 (07-04-25 @ 12:04)  HR: 74 (07-04-25 @ 12:04) (67 - 86)  BP: 120/74 (07-04-25 @ 12:04) (120/60 - 142/81)  RR: 18 (07-04-25 @ 12:04) (18 - 18)  SpO2: 97% (07-04-25 @ 12:04) (92% - 99%)  Wt(kg): --  I&O's Summary    03 Jul 2025 07:01  -  04 Jul 2025 07:00  --------------------------------------------------------  IN: 360 mL / OUT: 2100 mL / NET: -1740 mL      Daily     Daily     Appearance: Normal	  Cardiovascular: Normal S1 S2,RRR, No JVD, No murmurs  Respiratory: Lungs clear to auscultation	  Gastrointestinal:  Soft, Non-tender, + BS	  Extremities: Normal range of motion, No clubbing, cyanosis or edema      Home Medications:  acetaminophen 325 mg oral tablet: 2 tab(s) orally every 6 hours As needed Moderate Pain (4 - 6) (04 Jul 2025 08:47)  Eliquis 2.5 mg oral tablet: 1 tab(s) orally 2 times a day (24 Jun 2025 18:25)  gabapentin 100 mg oral capsule: 1 cap(s) orally 3 times a day (04 Jul 2025 08:47)  lidocaine 4% topical film: Apply topically to affected area once a day (04 Jul 2025 08:47)  metoprolol tartrate 25 mg oral tablet: 1 tab(s) orally 2 times a day (04 Jul 2025 08:47)  mirtazapine 15 mg oral tablet: 1 tab(s) orally once a day (at bedtime) (24 Jun 2025 18:25)  Multiple Vitamins oral tablet: 1 tab(s) orally once a day (24 Jun 2025 18:58)  olmesartan 40 mg oral tablet: 1 tab(s) orally once a day (24 Jun 2025 18:25)  polyethylene glycol 3350 oral powder for reconstitution: 17 gram(s) orally once a day (04 Jul 2025 08:47)  Potassium Chloride (Eqv-Klor-Con M20) 20 mEq oral tablet, extended release: 1 tab(s) orally once a day (with meals) (24 Jun 2025 18:33)  rosuvastatin 20 mg oral tablet: 1 tab(s) orally once a day (24 Jun 2025 18:25)  senna leaf extract oral tablet: 2 tab(s) orally once a day (at bedtime) (04 Jul 2025 08:47)      MEDICATIONS  (STANDING):  acetaminophen     Tablet .. 650 milliGRAM(s) Oral once  apixaban 2.5 milliGRAM(s) Oral every 12 hours  gabapentin 100 milliGRAM(s) Oral three times a day  lactated ringers. 1000 milliLiter(s) (100 mL/Hr) IV Continuous <Continuous>  lidocaine   4% Patch 1 Patch Transdermal daily  metoprolol tartrate 25 milliGRAM(s) Oral two times a day  mirtazapine 15 milliGRAM(s) Oral at bedtime  polyethylene glycol 3350 17 Gram(s) Oral daily  rosuvastatin 20 milliGRAM(s) Oral at bedtime  senna 2 Tablet(s) Oral at bedtime      TELEMETRY: 	    ECG:  	  RADIOLOGY:   DIAGNOSTIC TESTING:  [ ] Echocardiogram:  [ ] Catheterization:  [ ] Stress Test:    OTHER: 	    LABS:	 	    CARDIAC MARKERS:                        proBNP:     Lipid Profile:   HgA1c:     Creatinine: 0.82 mg/dL (07-02-25 @ 07:27)

## 2025-07-04 NOTE — DISCHARGE NOTE NURSING/CASE MANAGEMENT/SOCIAL WORK - PATIENT PORTAL LINK FT
You can access the FollowMyHealth Patient Portal offered by [facility] by registering at the following website: [email or web address]. By joining [facility]’s FollowMyHealth portal, you will also be able to view your health information using other applications (apps) compatible with our system.

## 2025-07-04 NOTE — DISCHARGE NOTE PROVIDER - NSDCMRMEDTOKEN_GEN_ALL_CORE_FT
acetaminophen 325 mg oral tablet: 2 tab(s) orally every 6 hours As needed Moderate Pain (4 - 6)  Eliquis 2.5 mg oral tablet: 1 tab(s) orally 2 times a day  gabapentin 100 mg oral capsule: 1 cap(s) orally 3 times a day  lidocaine 4% topical film: Apply topically to affected area once a day  metoprolol tartrate 25 mg oral tablet: 1 tab(s) orally 2 times a day  mirtazapine 15 mg oral tablet: 1 tab(s) orally once a day (at bedtime)  Multiple Vitamins oral tablet: 1 tab(s) orally once a day  olmesartan 40 mg oral tablet: 1 tab(s) orally once a day  polyethylene glycol 3350 oral powder for reconstitution: 17 gram(s) orally once a day  Potassium Chloride (Eqv-Klor-Con M20) 20 mEq oral tablet, extended release: 1 tab(s) orally once a day (with meals)  rosuvastatin 20 mg oral tablet: 1 tab(s) orally once a day  senna leaf extract oral tablet: 2 tab(s) orally once a day (at bedtime)

## 2025-07-04 NOTE — DISCHARGE NOTE PROVIDER - NSDCCPCAREPLAN_GEN_ALL_CORE_FT
PRINCIPAL DISCHARGE DIAGNOSIS  Diagnosis: CVA (cerebrovascular accident)  Assessment and Plan of Treatment: MRI brain with acute L MCA infarct   CT Head 6/29 with subacute L freontal infarct . chornic bilateral occipital and right thalalmic infarct   Continue Eliquis for afib, and continue Rouvastatin  Follow up with your neurologist  Rehab      SECONDARY DISCHARGE DIAGNOSES  Diagnosis: Acute UTI  Assessment and Plan of Treatment: HOME CARE INSTRUCTIONS  Completed antibiotics  Drink enough water and fluids to keep your urine clear or pale yellow.  Avoid caffeine, tea, and carbonated beverages. They tend to irritate your bladder.  Empty your bladder often. Avoid holding urine for long periods of time.  Empty your bladder before and after sexual intercourse.  After a bowel movement, women should cleanse from front to back. Use each tissue only once.  SEEK MEDICAL CARE IF:  You have back pain.  You develop a fever.  Your symptoms do not begin to resolve within 3 days.  SEEK IMMEDIATE MEDICAL CARE IF:  You have severe back pain or lower abdominal pain.  You develop chills.  You have nausea or vomiting.  You have continued burning or discomfort with urination.      Diagnosis: Aspiration pneumonitis  Assessment and Plan of Treatment: Completed antibiotics  Aspiration precautions  Easy to chew diet    Diagnosis: HTN (hypertension)  Assessment and Plan of Treatment: Low salt diet  Activity as tolerated.  Take all medication as prescribed.  Follow up with your medical doctor for routine blood pressure monitoring at your next visit.  Notify your doctor if you have any of the following symptoms:   Dizziness, Lightheadedness, Blurry vision, Headache, Chest pain, Shortness of breath      Diagnosis: Chronic atrial fibrillation  Assessment and Plan of Treatment: Atrial fibrillation is the most common heart rhythm problem.  The condition puts you at risk for has stroke and heart attack  It helps if you control your blood pressure, not drink more than 1-2 alcohol drinks per day, cut down on caffeine, getting treatment for over active thyroid gland, and get regular exercise  Call your doctor if you feel your heart racing or beating unusually, chest tightness or pain, lightheaded, faint, shortness of breath especially with exercise  It is important to take your heart medication as prescribed  You may be on anticoagulation which is very important to take as directed - you may need blood work to monitor drug levels      Diagnosis: Constipation  Assessment and Plan of Treatment: Maintain bowel regimen

## 2025-07-04 NOTE — PROGRESS NOTE ADULT - SUBJECTIVE AND OBJECTIVE BOX
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 Argueta), BIBEMS due to inability to speak or ambulate. LKW at around 3 AM on 6/24/2025. Daughter at bedside reports that she noted that patient was only making grunting sounds in response to conversation and appeared to have difficulty recognizing her daughter when she saw patient on 6/24/2025 at around 9 AM. However, daughter reports that patient has now returned to baseline. Of note, daughter stated that patient was reporting abdominal pain on 6/23/2025. Ambulates using a cane at baseline, requires assistance with eating and bathing. Reports adherence to Eliquis currently but reportedly had not been taking Eliquis for about 1 month for unknown reasons. Translation from Cantonese provided by daughter at bedside per patient and family preference.  (24 Jun 2025 18:58)      Patient seen and evaluated at bedside. No acute events overnight except as noted.    Interval HPI: no acute events o/n    PAST MEDICAL & SURGICAL HISTORY:  Hypertension      Hyperlipidemia      CVA (cerebral vascular accident)      Afib      No significant past surgical history          REVIEW OF SYSTEMS:  as per hpi       VITALS:    Vital Signs Last 24 Hrs  T(C): 36.8 (04 Jul 2025 12:04), Max: 36.8 (04 Jul 2025 12:04)  T(F): 98.2 (04 Jul 2025 12:04), Max: 98.2 (04 Jul 2025 12:04)  HR: 74 (04 Jul 2025 12:04) (67 - 86)  BP: 120/74 (04 Jul 2025 12:04) (120/60 - 142/81)  BP(mean): --  RR: 18 (04 Jul 2025 12:04) (18 - 18)  SpO2: 97% (04 Jul 2025 12:04) (92% - 99%)    Parameters below as of 04 Jul 2025 12:04  Patient On (Oxygen Delivery Method): room air            PHYSICAL EXAM:  GENERAL: NAD, well-developed, lethargic   HEAD:  Atraumatic, Normocephalic  EYES: EOMI, PERRLA, conjunctiva and sclera clear  NECK: Supple, No JVD  CHEST/LUNG: Clear to auscultation bilaterally; No wheeze  HEART: S1, S2; No murmurs, rubs, or gallops  ABDOMEN: Soft, Nontender, Nondistended; Bowel sounds present  EXTREMITIES:  2+ Peripheral Pulses, No clubbing, cyanosis, or edema  NEUROLOGY: AAOX3; +dysarthria   SKIN: No rashes or lesions    Consultant(s) Notes Reviewed:  [x ] YES  [ ] NO  Care Discussed with Consultants/Other Providers [ x] YES  [ ] NO    MEDS:   MEDICATIONS  (STANDING):  acetaminophen     Tablet .. 650 milliGRAM(s) Oral once  apixaban 2.5 milliGRAM(s) Oral every 12 hours  gabapentin 100 milliGRAM(s) Oral three times a day  lactated ringers. 1000 milliLiter(s) (100 mL/Hr) IV Continuous <Continuous>  lidocaine   4% Patch 1 Patch Transdermal daily  metoprolol tartrate 25 milliGRAM(s) Oral two times a day  mirtazapine 15 milliGRAM(s) Oral at bedtime  piperacillin/tazobactam IVPB.. 3.375 Gram(s) IV Intermittent every 8 hours  polyethylene glycol 3350 17 Gram(s) Oral daily  rosuvastatin 20 milliGRAM(s) Oral at bedtime  senna 2 Tablet(s) Oral at bedtime    MEDICATIONS  (PRN):  acetaminophen     Tablet .. 650 milliGRAM(s) Oral every 6 hours PRN Moderate Pain (4 - 6)    ALLERGIES:  shellfish (Unknown)  No Known Drug Allergies      LABS:                                        < from: CT Chest w/ IV Cont (06.29.25 @ 22:59) >  IMPRESSION:  Prominent distention of the rectum with stool, in keeping with the   clinical history of constipation.    Trace bilateral pleural effusions with adjacent passive atelectasis.   Otherwiseclear lungs.  .    --- End of Report ---    < end of copied text >        < from: CT Head No Cont (06.29.25 @ 13:35) >    IMPRESSION:  Small acute to subacute infarct in the left frontal cortex seen better on   MRI 6/25/2025. No hemorrhage.  Chronic bilateral occipital infarcts and a right thalamic lacunar infarct   is again noted.      < end of copied text >                 < from: [facility] Duplex Lower Ext Vein Scan, Bilat (06.27.25 @ 18:09) >  IMPRESSION:  No evidence of deep venous thrombosis in either lower extremity.        < end of copied text >                                   < from: TTE Limited W or WO Ultrasound Enhancing Agent (06.26.25 @ 14:40) >  _______________________________________________________________________________________     CONCLUSIONS:      1. Left ventricular cavity is small. Left ventricular wall thickness is normal. Left ventricular systolic function is normal with an ejection fraction of 57 % by Denson's method of disks. There are no regional wall motion abnormalities seen.   2. Normal right ventricular cavity size, with normal wall thickness, and normal right ventricular systolic function. Tricuspid annular plane systolic excursion (TAPSE) is 2.0 cm (normal >=1.7 cm).   3. Left atrium is mildly dilated.   4. The right atrium is mildly dilated.   5. No pericardial effusion seen.   6. No prior echocardiogram is available for comparison.   7. Agitated saline injection was negative for intracardiac shunt.   8. Severe tricuspid regurgitation.   9. There is normal LV mass and concentric remodeling.    ________________________________________________________________________________________    < end of copied text >      < from: MR Head No Cont (06.25.25 @ 20:50) >    IMPRESSION: Acute left MCA territory infarctions    --- End of Report ---        < end of copied text >           < from: CT Angio Neck Stroke Protocol w/ IV Cont (06.24.25 @ 10:18) >  IMPRESSION:    CT PERFUSION: No perfusion deficit.    CTA NECK AND HEAD: No large vessel occlusion or major stenosis.    --- End of Report ---      < end of copied text >

## 2025-07-04 NOTE — DISCHARGE NOTE PROVIDER - HOSPITAL COURSE
The patient is an 89-year-old female with a history of hypertension, hyperlipidemia, atrial fibrillation on chronic anticoagulation (Eliquis), and prior ischemic stroke in 2015 with residual left eye vision loss. She was brought in by EMS following acute onset of aphasia and inability to ambulate. On presentation, she was found to have dense right hemiparesis (RUE > RLE) with right-sided upper extremity drift. Her NIHSS was approximately 15, with a premorbid mRS of 2. Initial workup included CTA head and neck, which showed no large vessel occlusion. MRI of the brain demonstrated an acute left MCA infarct, with additional subacute and chronic infarcts in bilateral occipital lobes, right thalamus, basal ganglia, and paramedian midbrain. Echocardiogram showed normal left ventricular function (EF 57%) with no vegetations. Workup also revealed a hemoglobin A1c of 6.1 and LDL of 51.    Her hospital course was notable for altered mental status, attributed to both her acute stroke as well as superimposed infection, with urinary tract infection treated initially with ceftriaxone. During admission, she developed new-onset fever and hypotension. Blood and urine cultures were sent, broad-spectrum antibiotics were escalated to piperacillin-tazobactam for possible Aspiration pneumonitis, and Infectious Diseases was consulted; repeat cultures and CT imaging of the chest, abdomen, and pelvis showed no acute pathology. Her antibiotics were completed and infection resolved. Atrial fibrillation was managed with rate control using metoprolol, and anticoagulation with apixaban was resumed. Serial neurological examinations were performed, and she was evaluated by Neurology, with plans for outpatient follow-up. Statin therapy was optimized with rosuvastatin 20 mg nightly.    She also reported lower extremity pain, and bilateral lower extremity dopplers were obtained to rule out deep vein thrombosis, which were negative. A trial of gabapentin was initiated for neuropathic pain. Additionally, she experienced constipation, managed appropriately with a bowel regimen. Physical and occupational therapy were consulted for evaluation and ongoing rehabilitation needs given her deconditioning following the acute stroke.    Plan d/w Dr. Lyons and pt cleared for discharge to [facility] today.

## 2025-07-04 NOTE — PROGRESS NOTE ADULT - SUBJECTIVE AND OBJECTIVE BOX
Neurology      S: patient seen. doing okay        Medications: MEDICATIONS  (STANDING):  acetaminophen     Tablet .. 650 milliGRAM(s) Oral once  apixaban 2.5 milliGRAM(s) Oral every 12 hours  gabapentin 100 milliGRAM(s) Oral three times a day  lactated ringers. 1000 milliLiter(s) (100 mL/Hr) IV Continuous <Continuous>  lidocaine   4% Patch 1 Patch Transdermal daily  metoprolol tartrate 25 milliGRAM(s) Oral two times a day  mirtazapine 15 milliGRAM(s) Oral at bedtime  polyethylene glycol 3350 17 Gram(s) Oral daily  rosuvastatin 20 milliGRAM(s) Oral at bedtime  senna 2 Tablet(s) Oral at bedtime    MEDICATIONS  (PRN):  acetaminophen     Tablet .. 650 milliGRAM(s) Oral every 6 hours PRN Moderate Pain (4 - 6)       Vitals:  Vital Signs Last 24 Hrs  T(C): 36.5 (04 Jul 2025 04:29), Max: 36.7 (03 Jul 2025 11:09)  T(F): 97.7 (04 Jul 2025 04:29), Max: 98 (03 Jul 2025 11:09)  HR: 84 (04 Jul 2025 09:32) (70 - 86)  BP: 111/69 (04 Jul 2025 09:32) (111/69 - 146/81)  BP(mean): --  RR: 18 (04 Jul 2025 09:32) (18 - 18)  SpO2: 93% (04 Jul 2025 09:32) (92% - 99%)    Parameters below as of 04 Jul 2025 09:32  Patient On (Oxygen Delivery Method): nasal cannula                 General Exam:   General Appearance: Appropriately dressed and in no acute distress       Head: Normocephalic, atraumatic and no dysmorphic features  Ear, Nose, and Throat: Moist mucous membranes  CVS: S1S2+  Resp: No SOB, no wheeze or rhonchi  GI: soft NT/ND  Extremities: No edema or cyanosis  Skin: No bruises or rashes     Neurological Exam:  MS - AAO x2, speech fluent, rep/naming intact, follows commands  CN - PERRL, visual fields intact in right eye, unable to test if visual fields are intact in left eye as patient is unable to keep her eye open for testing, EOMI, face sensation (V1-V3) intact b/l, facial strength intact without asymmetry b/l, hearing intact b/l, palate with symmetric elevation, trapezius 5/5 strength b/l, tongue midline on protrusion with full lateral movement  Motor - Normal bulk/tone, strength 5/5 entire L side, RUE 3/5, RLE 4/5, RUE drift, pronator drift. R wrist drop  Sens - LT intact all  DTR's - Neutral b/l plantar response  Coord - Coordination grossly intact for level of strength, no tremors appreciated  Gait and station - Due to fall risk/safety concerns did not assess    Data/Labs/Imaging which I personally reviewed.       LABS:  no new labs                         11.7   7.71  )-----------( 216      ( 02 Jul 2025 07:26 )             36.6     07-02    140  |  101  |  11  ----------------------------<  105[H]  3.7   |  25  |  0.82    Ca    8.7      02 Jul 2025 07:27          Urinalysis Basic - ( 02 Jul 2025 07:27 )    Color: x / Appearance: x / SG: x / pH: x  Gluc: 105 mg/dL / Ketone: x  / Bili: x / Urobili: x   Blood: x / Protein: x / Nitrite: x   Leuk Esterase: x / RBC: x / WBC x   Sq Epi: x / Non Sq Epi: x / Bacteria: x                CT PERFUSION: No perfusion deficit.    CTA NECK AND HEAD: No large vessel occlusion or major stenosis.    --- End of Report ---    < end of copied text >    < from: CT Brain Stroke Protocol (06.24.25 @ 10:16) >    FINDINGS: There is no acute intracranial hemorrhage, mass effect, shift   of the midline structures, herniation, extra-axial fluid collection, or   hydrocephalus.    Chronic infarcts are seen within the bilateral occipital lobes and right   posteromedial temporal lobe. There is also a chronic lacunar infarct   within the right thalamus and also within the right basal ganglia. A   chronic infarct within the right paramedian midbrain is seen.    There is mild diffuse cerebral volume loss with prominence of the sulci,   fissures, and cisternal spaces which is normal for the patient's age.   There is mild deep and periventricular white matter hypoattenuation   statistically compatible with microvascular changes given calcific   atherosclerotic disease of the intracranial arteries.    Minor scattered mucosal thickening is seen throughout the paranasal   sinuses. The tympanomastoid cavities are clear. The calvarium is intact.   There is evidence of bilateral cataract removal.    IMPRESSION: No acute intracranial hemorrhage, mass effect, or shift of   the midline structures.    Multiple additional unchanged chronic intracranial findings, as discussed.    --- End of Report ---    < end of copied text >    IMPRESSION: Acute left MCA territory infarctions    IMPRESSION: R. hemiparesis likely due to L. MCA area infarct. Mechanism pending further workup.